# Patient Record
Sex: FEMALE | Race: BLACK OR AFRICAN AMERICAN | Employment: FULL TIME | URBAN - METROPOLITAN AREA
[De-identification: names, ages, dates, MRNs, and addresses within clinical notes are randomized per-mention and may not be internally consistent; named-entity substitution may affect disease eponyms.]

---

## 2017-02-21 ENCOUNTER — ALLIED HEALTH/NURSE VISIT (OUTPATIENT)
Dept: NURSING | Facility: CLINIC | Age: 42
End: 2017-02-21
Payer: COMMERCIAL

## 2017-02-21 DIAGNOSIS — Z11.1 SCREENING EXAMINATION FOR PULMONARY TUBERCULOSIS: Primary | ICD-10-CM

## 2017-02-21 PROCEDURE — 86580 TB INTRADERMAL TEST: CPT

## 2017-02-21 PROCEDURE — 99207 ZZC NO CHARGE NURSE ONLY: CPT

## 2017-02-21 NOTE — PROGRESS NOTES

## 2017-02-21 NOTE — MR AVS SNAPSHOT
"              After Visit Summary   2/21/2017    Minerva Thibodeaux    MRN: [id number]           Patient Information     Date Of Birth          1975        Visit Information        Provider Department      2/21/2017 3:20 PM BK ANCILL[facility]        Today's Diagnoses     Screening examination for pulmonary tuberculosis    -  1       Follow-ups after your visit        Follow-up notes from your care team     Return in about 2 days (around 2/23/2017) for injection.      Your next 10 appointments already scheduled     Feb 23, 2017  3:30 PM CST   Nurse Only with EVELIO RN   [facility] ([facility]    [address] [place] [id number] [phone]              Who to contact     If you have questions or need follow up information about today's clinic visit or your schedule please contact [facility] directly at [phone].  Normal or non-critical lab and imaging results will be communicated to you by MyChart, letter or phone within 4 business days after the clinic has received the results. If you do not hear from us within 7 days, please contact the clinic through MyChart or phone. If you have a critical or abnormal lab result, we will notify you by phone as soon as possible.  Submit refill requests through Joppel or call your pharmacy and they will forward the refill request to us. Please allow 3 business days for your refill to be completed.          Additional Information About Your Visit        MyChart Information     Joppel lets you send messages to your doctor, view your test results, renew your prescriptions, schedule appointments and more. To sign up, go to [email or web address] . Click on \"Log in\" on the left side of the screen, which will take you to the Welcome page. Then click on \"Sign up Now\" on the right side of the page.     You will be asked to enter the access code listed below, as well as some " personal information. Please follow the directions to create your username and password.     Your access code is: [email or web address]  Expires: 2017  3:44 PM     Your access code will  in 90 days. If you need help or a new code, please call your [facility] clinic or [phone].        Care EveryWhere ID     This is your Care EveryWhere ID. This could be used by other organizations to access your [facility] medical records  [id number]        Your Vitals Were     Last Period                   2010            Blood Pressure from Last 3 Encounters:   16 112/76   10/26/15 105/78   10/25/15 106/79    Weight from Last 3 Encounters:   16 165 lb (74.8 kg)   10/26/15 162 lb (73.5 kg)   10/25/15 162 lb 3.2 oz (73.6 kg)              We Performed the Following     TB INTRADERMAL TEST        Primary Care Provider Office Phone # Fax #    Viri Thomas Talamantes [phone]       [facility] [address] [facility] [address]        Thank you!     Thank you for choosing [facility]  for your care. Our goal is always to provide you with excellent care. Hearing back from our patients is one way we can continue to improve our services. Please take a few minutes to complete the written survey that you may receive in the mail after your visit with us. Thank you!             Your Updated Medication List - Protect others around you: Learn how to safely use, store and throw away your medicines at www.disposemymeds.org.          This list is accurate as of: 17  3:44 PM.  Always use your most recent med list.                   Brand Name Dispense Instructions for use    triamcinolone 0.1 % cream    KENALOG    60 g    Apply sparingly to affected area three times daily as needed

## 2017-02-23 ENCOUNTER — OFFICE VISIT (OUTPATIENT)
Dept: FAMILY MEDICINE | Facility: CLINIC | Age: 42
End: 2017-02-23
Payer: COMMERCIAL

## 2017-02-23 ENCOUNTER — RADIANT APPOINTMENT (OUTPATIENT)
Dept: GENERAL RADIOLOGY | Facility: CLINIC | Age: 42
End: 2017-02-23
Attending: PHYSICIAN ASSISTANT
Payer: COMMERCIAL

## 2017-02-23 ENCOUNTER — ALLIED HEALTH/NURSE VISIT (OUTPATIENT)
Dept: NURSING | Facility: CLINIC | Age: 42
End: 2017-02-23
Payer: COMMERCIAL

## 2017-02-23 DIAGNOSIS — R76.11 MANTOUX: POSITIVE: Primary | ICD-10-CM

## 2017-02-23 DIAGNOSIS — Z09 NEED FOR IMMUNIZATION FOLLOW-UP: ICD-10-CM

## 2017-02-23 DIAGNOSIS — R76.11 MANTOUX: POSITIVE: ICD-10-CM

## 2017-02-23 LAB
PPDINDURATION: 12 MM (ref 0–5)
PPDREDNESS: 12 MM

## 2017-02-23 PROCEDURE — 86735 MUMPS ANTIBODY: CPT | Performed by: PHYSICIAN ASSISTANT

## 2017-02-23 PROCEDURE — 99207 ZZC NO CHARGE NURSE ONLY: CPT

## 2017-02-23 PROCEDURE — 86762 RUBELLA ANTIBODY: CPT | Performed by: PHYSICIAN ASSISTANT

## 2017-02-23 PROCEDURE — 99213 OFFICE O/P EST LOW 20 MIN: CPT | Performed by: PHYSICIAN ASSISTANT

## 2017-02-23 PROCEDURE — 36415 COLL VENOUS BLD VENIPUNCTURE: CPT | Performed by: PHYSICIAN ASSISTANT

## 2017-02-23 PROCEDURE — 86765 RUBEOLA ANTIBODY: CPT | Performed by: PHYSICIAN ASSISTANT

## 2017-02-23 PROCEDURE — 71020 XR CHEST 2 VW: CPT

## 2017-02-23 PROCEDURE — 86787 VARICELLA-ZOSTER ANTIBODY: CPT | Performed by: PHYSICIAN ASSISTANT

## 2017-02-23 PROCEDURE — 86706 HEP B SURFACE ANTIBODY: CPT | Performed by: PHYSICIAN ASSISTANT

## 2017-02-23 NOTE — PROGRESS NOTES
SUBJECTIVE:                                                    Minerva Thibodeaux is a 41 year old female who presents to clinic today for the following health issues:      Concern - Positive mantoux reading          Description:   Had a positive mantoux reading today   Has had mantouxs done in the past but have not  been negative         Accompanying Signs & Symptoms:  No cough       Needs an xray done      Therapies Tried and outcome: N/A    Patient is asymptomatic.  Patient also needs proof of immunity for MMR, Varicella and Hep B.       Problem list and histories reviewed & adjusted, as indicated.  Additional history: as documented    Patient Active Problem List   Diagnosis     Recurrent boils     CARDIOVASCULAR SCREENING; LDL GOAL LESS THAN 160     Tobacco use disorder     H/O: hysterectomy     Drug-seeking behavior     Past Surgical History   Procedure Laterality Date     Laparotomy mini, tubal ligation, combined  2005     Laparoscopy  2010     HALS with partial liver resection for benign adenoma     Liver surgery  1/4/11     [facility], excise lesion/adenoma     Hysterectomy, pap no longer indicated  2011     TVH for adenomyosis, fibroids, Dr. Bridget TOWNSEND vaginal hysterectomy  2011     Cryotherapy, cervical  1999     Knee surgery         Social History   Substance Use Topics     Smoking status: Current Some Day Smoker     Packs/day: 0.20     Years: 15.00     Types: Cigarettes     Smokeless tobacco: Never Used      Comment: social 1-2/month     Alcohol use Yes      Comment: occasional  rare     Family History   Problem Relation Age of Onset     DIABETES Mother      Lipids Mother      GASTROINTESTINAL DISEASE Daughter      Hepatitis A     Hypertension Father      Unknown/Adopted Maternal Grandmother      CANCER Maternal Grandfather      CEREBROVASCULAR DISEASE Paternal Grandmother      Unknown/Adopted Paternal Grandfather          Current Outpatient Prescriptions   Medication Sig Dispense Refill     triamcinolone  (KENALOG) 0.1 % cream Apply sparingly to affected area three times daily as needed 60 g 0     Problem list, Medication list, Allergies, and Medical/Social/Surgical histories reviewed in EPIC and updated as appropriate.    ROS:  Constitutional, HEENT, cardiovascular, pulmonary, gi and gu systems are negative, except as otherwise noted.    OBJECTIVE:                                                    LMP 12/11/2010  Breastfeeding? No  There is no height or weight on file to calculate BMI.  GENERAL: healthy, alert and no distress  EYES: Eyes grossly normal to inspection,  conjunctivae and sclerae normal  HENT: normal cephalic/atraumatic, face is symmetrical,   RESP: no difficulty breathing, no use of accessory muscles observed.   CV: no peripheral edema and color normal, no parasternal heaves visualized.   MS: no gross musculoskeletal defects noted, no edema  SKIN: no suspicious lesions or rashes  NEURO: Normal strength and tone, mentation intact and speech normal  PSYCH: mentation appears normal, affect normal/bright      Diagnostic Test Results:  CXR - negative     ASSESSMENT/PLAN:                                                        ICD-10-CM    1. Mantoux: positive R76.11 XR Chest 2 Views   2. Need for immunization follow-up Z09 Rubeola Antibody IgG     Rubella Antibody IgG Quantitative     Mumps Antibody IgG     Varicella Zoster Virus Antibody IgG     Hepatitis B Surface Antibody     Titers are pending  Radiology report is pending  Patient will pick it up on Monday.       Cherie Pulido PA-C  [facility]

## 2017-02-23 NOTE — NURSING NOTE
"Chief Complaint   Patient presents with     Orders       Initial LMP 12/11/2010  Breastfeeding? No Estimated body mass index is 26.63 kg/(m^2) as calculated from the following:    Height as of 10/26/15: 5' 6\" (1.676 m).    Weight as of 9/11/16: 165 lb (74.8 kg).  Medication Reconciliation: complete     June Keith CMA      "

## 2017-02-23 NOTE — PROGRESS NOTES
Mantoux result: Positive.   Lab Results   Component Value Date    PPDREDNESS 12 02/23/2017    PPDINDURATIO 12 02/23/2017     Confirmed induration value by bringing in Dr. Rojas to assess mantoux area on patient's left forearm.  Patient needs to start work on Monday so requests immediate follow up. Patient placed on same day schedule. She is also requesting immunization record however, there is limited [facility] in [facility]. She reports her maiden name is Zoey ( 10 years ago). She also lived in [place], [place] 18 years ago. She reports she does not know what health system she was seen in. Unable to access immunization records. Will inform same day provider.     Daja Crockett RN

## 2017-02-23 NOTE — MR AVS SNAPSHOT
"              After Visit Summary   2017    Minerva Thibodeaux    MRN: [id number]           Patient Information     Date Of Birth          1975        Visit Information        Provider Department      2017 3:30 PM BK RN [facility]        Today's Diagnoses     Mantoux: positive    -  1       Follow-ups after your visit        Who to contact     If you have questions or need follow up information about today's clinic visit or your schedule please contact [facility] directly at [phone].  Normal or non-critical lab and imaging results will be communicated to you by AcuFocushart, letter or phone within 4 business days after the clinic has received the results. If you do not hear from us within 7 days, please contact the clinic through [email or web address] or phone. If you have a critical or abnormal lab result, we will notify you by phone as soon as possible.  Submit refill requests through eASIC or call your pharmacy and they will forward the refill request to us. Please allow 3 business days for your refill to be completed.          Additional Information About Your Visit        MyChart Information     eASIC lets you send messages to your doctor, view your test results, renew your prescriptions, schedule appointments and more. To sign up, go to [email or web address] . Click on \"Log in\" on the left side of the screen, which will take you to the Welcome page. Then click on \"Sign up Now\" on the right side of the page.     You will be asked to enter the access code listed below, as well as some personal information. Please follow the directions to create your username and password.     Your access code is: [email or web address]  Expires: 2017  3:44 PM     Your access code will  in 90 days. If you need help or a new code, please call your [facility] or [phone].        Care EveryWhere ID     This is your Care EveryWhere ID. This could be used by other organizations to " access your [facility] medical records  [id number]        Your Vitals Were     Last Period                   12/11/2010            Blood Pressure from Last 3 Encounters:   09/11/16 112/76   10/26/15 105/78   10/25/15 106/79    Weight from Last 3 Encounters:   09/11/16 165 lb (74.8 kg)   10/26/15 162 lb (73.5 kg)   10/25/15 162 lb 3.2 oz (73.6 kg)              Today, you had the following     No orders found for display       Primary Care Provider Office Phone # Fax #    Viri Thomas Talamantes [phone]       [facility] [address]  [facility] [address]        Thank you!     Thank you for choosing [facility]  for your care. Our goal is always to provide you with excellent care. Hearing back from our patients is one way we can continue to improve our services. Please take a few minutes to complete the written survey that you may receive in the mail after your visit with us. Thank you!             Your Updated Medication List - Protect others around you: Learn how to safely use, store and throw away your medicines at www.disposemymeds.org.          This list is accurate as of: 2/23/17  4:22 PM.  Always use your most recent med list.                   Brand Name Dispense Instructions for use    triamcinolone 0.1 % cream    KENALOG    60 g    Apply sparingly to affected area three times daily as needed

## 2017-02-23 NOTE — LETTER
[facility]   [address]  [place] [address]      February 24, 2017      Minerva Thibodeaux  [address] [facility] [place] [address]            Dear Minerva Thibodeaux        Enclosed is a copy of the results. Patient is immune to all titers that are tested. Xray is negative.     If you have any questions or concerns, please contact our team at [phone].      Sincerely,    Libby Pulido PAC/ak          Results for orders placed or performed in visit on 02/23/17   Rubeola Antibody IgG   Result Value Ref Range    Rubeola (Measles) Antibody IgG 5.3 (H) 0.0 - 0.8 AI   Rubella Antibody IgG Quantitative   Result Value Ref Range    Rubella Antibody IgG Quantitative 43 IU/mL   Mumps Antibody IgG   Result Value Ref Range    Mumps Antibody IgG 2.6 (H) 0.0 - 0.8 AI   Varicella Zoster Virus Antibody IgG   Result Value Ref Range    Varicella Zoster Virus Antibody IgG 7.4 (H) 0.0 - 0.8 AI   Hepatitis B Surface Antibody   Result Value Ref Range    Hepatitis B Surface Antibody 986.95 (H) <8.00 m[IU]/mL

## 2017-02-23 NOTE — MR AVS SNAPSHOT
"              After Visit Summary   2017    Minerva Thibodeaux    MRN: [id number]           Patient Information     Date Of Birth          1975        Visit Information        Provider Department      2017 4:20 PM Cherie Pulido PA-C [facility]        Today's Diagnoses     Mantoux: positive    -  1    Need for immunization follow-up           Follow-ups after your visit        Who to contact     If you have questions or need follow up information about today's clinic visit or your schedule please contact [facility] directly at [phone].  Normal or non-critical lab and imaging results will be communicated to you by DecisionPoint Systemshart, letter or phone within 4 business days after the clinic has received the results. If you do not hear from us within 7 days, please contact the clinic through DecisionPoint Systemshart or phone. If you have a critical or abnormal lab result, we will notify you by phone as soon as possible.  Submit refill requests through Bet[employer] Gaming or call your pharmacy and they will forward the refill request to us. Please allow 3 business days for your refill to be completed.          Additional Information About Your Visit        MyChart Information     Bet[employer] Gaming lets you send messages to your doctor, view your test results, renew your prescriptions, schedule appointments and more. To sign up, go to [email or web address] . Click on \"Log in\" on the left side of the screen, which will take you to the Welcome page. Then click on \"Sign up Now\" on the right side of the page.     You will be asked to enter the access code listed below, as well as some personal information. Please follow the directions to create your username and password.     Your access code is: [email or web address]  Expires: 2017  3:44 PM     Your access code will  in 90 days. If you need help or a new code, please call your [facility] or [phone].        Care EveryWhere ID     This is " your Care EveryWhere ID. This could be used by other organizations to access your [facility] medical records  [id number]        Your Vitals Were     Last Period Breastfeeding?                12/11/2010 No           Blood Pressure from Last 3 Encounters:   09/11/16 112/76   10/26/15 105/78   10/25/15 106/79    Weight from Last 3 Encounters:   09/11/16 165 lb (74.8 kg)   10/26/15 162 lb (73.5 kg)   10/25/15 162 lb 3.2 oz (73.6 kg)              We Performed the Following     Hepatitis B Surface Antibody     Mumps Antibody IgG     Rubella Antibody IgG Quantitative     Rubeola Antibody IgG     Varicella Zoster Virus Antibody IgG        Primary Care Provider Office Phone # Fax #    Viri Thomas Talamantes [phone]       [facility] [address] [place]  [facility] [address]        Thank you!     Thank you for choosing [facility]  for your care. Our goal is always to provide you with excellent care. Hearing back from our patients is one way we can continue to improve our services. Please take a few minutes to complete the written survey that you may receive in the mail after your visit with us. Thank you!             Your Updated Medication List - Protect others around you: Learn how to safely use, store and throw away your medicines at www.disposemymeds.org.          This list is accurate as of: 2/23/17  5:07 PM.  Always use your most recent med list.                   Brand Name Dispense Instructions for use    triamcinolone 0.1 % cream    KENALOG    60 g    Apply sparingly to affected area three times daily as needed

## 2017-02-24 LAB
HBV SURFACE AB SERPL IA-ACNC: 986.95 M[IU]/ML
MEV IGG SER QL IA: 5.3 AI (ref 0–0.8)
MUV IGG SER QL IA: 2.6 AI (ref 0–0.8)
RUBV IGG SERPL IA-ACNC: 43 IU/ML
VZV IGG SER QL IA: 7.4 AI (ref 0–0.8)

## 2017-05-12 ENCOUNTER — OFFICE VISIT (OUTPATIENT)
Dept: URGENT CARE | Facility: URGENT CARE | Age: 42
End: 2017-05-12
Payer: COMMERCIAL

## 2017-05-12 VITALS
HEART RATE: 83 BPM | TEMPERATURE: 97.7 F | OXYGEN SATURATION: 96 % | DIASTOLIC BLOOD PRESSURE: 76 MMHG | BODY MASS INDEX: 25.18 KG/M2 | SYSTOLIC BLOOD PRESSURE: 116 MMHG | WEIGHT: 156 LBS

## 2017-05-12 DIAGNOSIS — L30.9 ECZEMA, UNSPECIFIED TYPE: ICD-10-CM

## 2017-05-12 DIAGNOSIS — K52.9 GASTROENTERITIS: Primary | ICD-10-CM

## 2017-05-12 PROCEDURE — 99213 OFFICE O/P EST LOW 20 MIN: CPT | Performed by: FAMILY MEDICINE

## 2017-05-12 RX ORDER — OXYCODONE 18 MG/1
CAPSULE, EXTENDED RELEASE ORAL
Refills: 0 | COMMUNITY
Start: 2017-05-01 | End: 2018-02-07

## 2017-05-12 RX ORDER — TRIAMCINOLONE ACETONIDE 1 MG/G
CREAM TOPICAL
Qty: 60 G | Refills: 0 | Status: SHIPPED | OUTPATIENT
Start: 2017-05-12 | End: 2018-02-07

## 2017-05-12 RX ORDER — OXYCODONE HYDROCHLORIDE 10 MG/1
10 TABLET ORAL
COMMUNITY
Start: 2017-05-01 | End: 2017-05-29

## 2017-05-12 ASSESSMENT — PAIN SCALES - GENERAL: PAINLEVEL: EXTREME PAIN (8)

## 2017-05-12 NOTE — NURSING NOTE
"Chief Complaint   Patient presents with     Abdominal Pain     Pt c/o abdominal pain and cramping.        Initial /76 (BP Location: Left arm, Patient Position: Chair, Cuff Size: Adult Regular)  Pulse 83  Temp 97.7  F (36.5  C) (Oral)  Wt 156 lb (70.8 kg)  LMP 12/11/2010  SpO2 96%  Breastfeeding? No  BMI 25.18 kg/m2 Estimated body mass index is 25.18 kg/(m^2) as calculated from the following:    Height as of 10/26/15: 5' 6\" (1.676 m).    Weight as of this encounter: 156 lb (70.8 kg).  Medication Reconciliation: complete     Sussy Bennett CMA (AAMA)      "

## 2017-05-12 NOTE — LETTER
[facility]  [address] [facility] [address]  Phone: [phone]    May 12, 2017        Minerva Thibodeaux  [address] [facility] [address]          To whom it may concern:    RE: Minerva Thibodeaux      Patient was seen and treated today at our clinic and noted to have ongoing gastroenteritis. I would expect this to gradually resolve over the next 2-4 days without medications. She should remain well hydrated and focus on fluid replacement in the interim and gradually advance her diet as tolerated. Follow-up if symptoms persist or worsen.        Sincerely,        Andrew Lee MD

## 2017-05-12 NOTE — PROGRESS NOTES
Some of this note was populated by a medical assistant.      SUBJECTIVE:                                                    Minerva Thibodeaux is a 41 year old female who presents to clinic today for the following health issues:    Abdominal Pain      Duration: Monday    Description (location/character/radiation): cramping, fever, vomiting, nausea, headaches       Associated flank pain: None    Intensity:  severe    Accompanying signs and symptoms: feeling like she is constipated, sharp cramping about every 3-4 mins       Fever/Chills: YES       Gas/Bloating: no        Nausea/vomitting: YES, yesterday x 2        Diarrhea: YES- watery, x 2.            Dysuria or Hematuria: no     History (previous similar pain/trauma/previous testing): no     Precipitating or alleviating factors:       Pain worse with eating/BM/urination: loss of appetite/         Pain relieved by BM: no     Therapies tried and outcome: rest and fluids, aleve- no relief.    LMP:  Not applicable       Hx of hip impingement over the past year and taking pain medicines (see Rx list)      Problem list and histories reviewed & adjusted, as indicated.  Additional history: none    Patient Active Problem List   Diagnosis     Recurrent boils     CARDIOVASCULAR SCREENING; LDL GOAL LESS THAN 160     Tobacco use disorder     H/O: hysterectomy     Drug-seeking behavior     Past Surgical History:   Procedure Laterality Date     C VAGINAL HYSTERECTOMY  2011     CRYOTHERAPY, CERVICAL  1999     HYSTERECTOMY, PAP NO LONGER INDICATED  2011    TVH for adenomyosis, fibroids, Dr. Gill     KNEE SURGERY       LAPAROSCOPY  2010    HALS with partial liver resection for benign adenoma     LAPAROTOMY MINI, TUBAL LIGATION, COMBINED  2005     LIVER SURGERY  1/4/11    [facility], excise lesion/adenoma       Social History   Substance Use Topics     Smoking status: Current Some Day Smoker     Packs/day: 0.20     Years: 15.00     Types: Cigarettes     Smokeless tobacco: Never Used       Comment: social 1-2/month     Alcohol use Yes      Comment: occasional  rare     Family History   Problem Relation Age of Onset     DIABETES Mother      Lipids Mother      GASTROINTESTINAL DISEASE Daughter      Hepatitis A     Hypertension Father      Unknown/Adopted Maternal Grandmother      CANCER Maternal Grandfather      CEREBROVASCULAR DISEASE Paternal Grandmother      Unknown/Adopted Paternal Grandfather          Current Outpatient Prescriptions   Medication Sig Dispense Refill     oxyCODONE (ROXICODONE) 10 MG IR tablet Take 10 mg by mouth       XTAMPZA ER 18 MG C12A TK ONE C PO  BID  0     triamcinolone (KENALOG) 0.1 % cream Apply sparingly to affected area three times daily as needed 60 g 0     Allergies   Allergen Reactions     Acetaminophen      Instructed not to take until 1 year after liver surgery (i.e. 1/4/12)     Codeine GI Disturbance     Ibuprofen Hives       Reviewed and updated as needed this visit by clinical staff       Reviewed and updated as needed this visit by Provider         ROS:  Constitutional, HEENT, cardiovascular, pulmonary, gi and gu systems are negative, except as otherwise noted.    OBJECTIVE:                                                    /76 (BP Location: Left arm, Patient Position: Chair, Cuff Size: Adult Regular)  Pulse 83  Temp 97.7  F (36.5  C) (Oral)  Wt 156 lb (70.8 kg)  LMP 12/11/2010  SpO2 96%  Breastfeeding? No  BMI 25.18 kg/m2  Body mass index is 25.18 kg/(m^2).  GENERAL: healthy, alert and no distress  NECK: no adenopathy, no asymmetry, masses, or scars and thyroid normal to palpation  RESP: lungs clear to auscultation - no rales, rhonchi or wheezes  CV: regular rate and rhythm, normal S1 S2, no S3 or S4, no murmur, click or rub, no peripheral edema and peripheral pulses strong  ABDOMEN: soft, nontender, no hepatosplenomegaly, no masses and bowel sounds normal  MS: no gross musculoskeletal defects noted, no edema         ASSESSMENT/PLAN:                                                         ICD-10-CM    1. Gastroenteritis K52.9    2. Eczema, unspecified type L30.9 triamcinolone (KENALOG) 0.1 % cream     PLAN  Patient educational/instructional material provided including reasons for follow-up    Refill as above provided per request  The patient indicates understanding of these issues and agrees with the plan.  MD Andrew Villegas MD  [facility]

## 2017-05-12 NOTE — MR AVS SNAPSHOT
"              After Visit Summary   2017    Minerva Thibodeaux    MRN: [id number]           Patient Information     Date Of Birth          1975        Visit Information        Provider Department      2017 1:50 PM Andrwe Lee MD [facility]         Follow-ups after your visit        Who to contact     If you have questions or need follow up information about today's clinic visit or your schedule please contact [facility] directly at [phone].  Normal or non-critical lab and imaging results will be communicated to you by MyChart, letter or phone within 4 business days after the clinic has received the results. If you do not hear from us within 7 days, please contact the clinic through [facility] or phone. If you have a critical or abnormal lab result, we will notify you by phone as soon as possible.  Submit refill requests through [facility] or call your pharmacy and they will forward the refill request to us. Please allow 3 business days for your refill to be completed.          Additional Information About Your Visit        MyChart Information     [facility] lets you send messages to your doctor, view your test results, renew your prescriptions, schedule appointments and more. To sign up, go to [email or web address] . Click on \"Log in\" on the left side of the screen, which will take you to the Welcome page. Then click on \"Sign up Now\" on the right side of the page.     You will be asked to enter the access code listed below, as well as some personal information. Please follow the directions to create your username and password.     Your access code is: [email or web address]  Expires: 2017  4:44 PM     Your access code will  in 90 days. If you need help or a new code, please call your [facility] or [phone].        Care EveryWhere ID     This is your Care EveryWhere ID. This could be used by other organizations to access your [place] medical " records  [id number]        Your Vitals Were     Pulse Temperature Last Period Pulse Oximetry Breastfeeding? BMI (Body Mass Index)    83 97.7  F (36.5  C) (Oral) 12/11/2010 96% No 25.18 kg/m2       Blood Pressure from Last 3 Encounters:   05/12/17 116/76   09/11/16 112/76   10/26/15 105/78    Weight from Last 3 Encounters:   05/12/17 156 lb (70.8 kg)   09/11/16 165 lb (74.8 kg)   10/26/15 162 lb (73.5 kg)              Today, you had the following     No orders found for display       Primary Care Provider Office Phone # Fax #    Viri Talamantes [phone]       [address] [address]        Thank you!     Thank you for choosing [facility]  for your care. Our goal is always to provide you with excellent care. Hearing back from our patients is one way we can continue to improve our services. Please take a few minutes to complete the written survey that you may receive in the mail after your visit with us. Thank you!             Your Updated Medication List - Protect others around you: Learn how to safely use, store and throw away your medicines at www.disposemymeds.org.          This list is accurate as of: 5/12/17  2:30 PM.  Always use your most recent med list.                   Brand Name Dispense Instructions for use    oxyCODONE 10 MG IR tablet    ROXICODONE     Take 10 mg by mouth       triamcinolone 0.1 % cream    KENALOG    60 g    Apply sparingly to affected area three times daily as needed       XTAMPZA ER 18 MG C12a   Generic drug:  OxyCODONE ER      TK ONE C PO  BID

## 2017-10-01 ENCOUNTER — HEALTH MAINTENANCE LETTER (OUTPATIENT)
Age: 42
End: 2017-10-01

## 2018-02-07 ENCOUNTER — OFFICE VISIT (OUTPATIENT)
Dept: FAMILY MEDICINE | Facility: CLINIC | Age: 43
End: 2018-02-07
Payer: COMMERCIAL

## 2018-02-07 VITALS
RESPIRATION RATE: 14 BRPM | DIASTOLIC BLOOD PRESSURE: 80 MMHG | TEMPERATURE: 98.3 F | HEART RATE: 80 BPM | SYSTOLIC BLOOD PRESSURE: 118 MMHG | BODY MASS INDEX: 26.63 KG/M2 | OXYGEN SATURATION: 98 % | WEIGHT: 165 LBS

## 2018-02-07 DIAGNOSIS — Z13.0 SCREENING FOR DEFICIENCY ANEMIA: ICD-10-CM

## 2018-02-07 DIAGNOSIS — L30.9 ECZEMA, UNSPECIFIED TYPE: ICD-10-CM

## 2018-02-07 DIAGNOSIS — M25.851 HIP IMPINGEMENT SYNDROME, RIGHT: ICD-10-CM

## 2018-02-07 DIAGNOSIS — R21 RASH: Primary | ICD-10-CM

## 2018-02-07 DIAGNOSIS — Z13.220 SCREENING FOR CHOLESTEROL LEVEL: ICD-10-CM

## 2018-02-07 DIAGNOSIS — Z13.1 ENCOUNTER FOR SCREENING EXAMINATION FOR IMPAIRED GLUCOSE REGULATION AND DIABETES MELLITUS: ICD-10-CM

## 2018-02-07 LAB
ALBUMIN SERPL-MCNC: 3.6 G/DL (ref 3.4–5)
ALP SERPL-CCNC: 51 U/L (ref 40–150)
ALT SERPL W P-5'-P-CCNC: 15 U/L (ref 0–50)
ANION GAP SERPL CALCULATED.3IONS-SCNC: 8 MMOL/L (ref 3–14)
AST SERPL W P-5'-P-CCNC: 12 U/L (ref 0–45)
BASOPHILS # BLD AUTO: 0 10E9/L (ref 0–0.2)
BASOPHILS NFR BLD AUTO: 0.4 %
BILIRUB SERPL-MCNC: 0.2 MG/DL (ref 0.2–1.3)
BUN SERPL-MCNC: 10 MG/DL (ref 7–30)
CALCIUM SERPL-MCNC: 8.9 MG/DL (ref 8.5–10.1)
CHLORIDE SERPL-SCNC: 103 MMOL/L (ref 94–109)
CHOLEST SERPL-MCNC: 143 MG/DL
CO2 SERPL-SCNC: 27 MMOL/L (ref 20–32)
CREAT SERPL-MCNC: 0.85 MG/DL (ref 0.52–1.04)
DIFFERENTIAL METHOD BLD: NORMAL
EOSINOPHIL # BLD AUTO: 0.2 10E9/L (ref 0–0.7)
EOSINOPHIL NFR BLD AUTO: 4.5 %
ERYTHROCYTE [DISTWIDTH] IN BLOOD BY AUTOMATED COUNT: 13.9 % (ref 10–15)
GFR SERPL CREATININE-BSD FRML MDRD: 73 ML/MIN/1.7M2
GLUCOSE SERPL-MCNC: 117 MG/DL (ref 70–99)
HBA1C MFR BLD: 5.5 % (ref 4.3–6)
HCT VFR BLD AUTO: 37.5 % (ref 35–47)
HDLC SERPL-MCNC: 69 MG/DL
HGB BLD-MCNC: 12.6 G/DL (ref 11.7–15.7)
LDLC SERPL CALC-MCNC: 63 MG/DL
LYMPHOCYTES # BLD AUTO: 1.6 10E9/L (ref 0.8–5.3)
LYMPHOCYTES NFR BLD AUTO: 34.2 %
MCH RBC QN AUTO: 30.4 PG (ref 26.5–33)
MCHC RBC AUTO-ENTMCNC: 33.6 G/DL (ref 31.5–36.5)
MCV RBC AUTO: 90 FL (ref 78–100)
MONOCYTES # BLD AUTO: 0.4 10E9/L (ref 0–1.3)
MONOCYTES NFR BLD AUTO: 9.2 %
NEUTROPHILS # BLD AUTO: 2.4 10E9/L (ref 1.6–8.3)
NEUTROPHILS NFR BLD AUTO: 51.7 %
NONHDLC SERPL-MCNC: 74 MG/DL
PLATELET # BLD AUTO: 231 10E9/L (ref 150–450)
POTASSIUM SERPL-SCNC: 3.9 MMOL/L (ref 3.4–5.3)
PROT SERPL-MCNC: 7.4 G/DL (ref 6.8–8.8)
RBC # BLD AUTO: 4.15 10E12/L (ref 3.8–5.2)
SODIUM SERPL-SCNC: 138 MMOL/L (ref 133–144)
TRIGL SERPL-MCNC: 53 MG/DL
WBC # BLD AUTO: 4.7 10E9/L (ref 4–11)

## 2018-02-07 PROCEDURE — 83036 HEMOGLOBIN GLYCOSYLATED A1C: CPT | Performed by: PHYSICIAN ASSISTANT

## 2018-02-07 PROCEDURE — 80053 COMPREHEN METABOLIC PANEL: CPT | Performed by: PHYSICIAN ASSISTANT

## 2018-02-07 PROCEDURE — 85025 COMPLETE CBC W/AUTO DIFF WBC: CPT | Performed by: PHYSICIAN ASSISTANT

## 2018-02-07 PROCEDURE — 99214 OFFICE O/P EST MOD 30 MIN: CPT | Performed by: PHYSICIAN ASSISTANT

## 2018-02-07 PROCEDURE — 80061 LIPID PANEL: CPT | Performed by: PHYSICIAN ASSISTANT

## 2018-02-07 PROCEDURE — 36415 COLL VENOUS BLD VENIPUNCTURE: CPT | Performed by: PHYSICIAN ASSISTANT

## 2018-02-07 RX ORDER — TRIAMCINOLONE ACETONIDE 1 MG/G
CREAM TOPICAL
Qty: 60 G | Refills: 0 | Status: SHIPPED | OUTPATIENT
Start: 2018-02-07 | End: 2019-06-13

## 2018-02-07 RX ORDER — PREDNISONE 10 MG/1
TABLET ORAL
Qty: 12 TABLET | Refills: 0 | Status: SHIPPED | OUTPATIENT
Start: 2018-02-07 | End: 2019-06-13

## 2018-02-07 RX ORDER — OXYCODONE HYDROCHLORIDE 10 MG/1
15 TABLET ORAL 3 TIMES DAILY
Refills: 0 | COMMUNITY
Start: 2018-02-02 | End: 2023-02-11

## 2018-02-07 RX ORDER — HYDROXYZINE PAMOATE 25 MG/1
CAPSULE ORAL
Qty: 30 CAPSULE | Refills: 1 | Status: SHIPPED | OUTPATIENT
Start: 2018-02-07 | End: 2019-06-13

## 2018-02-07 RX ORDER — AMITRIPTYLINE HYDROCHLORIDE 50 MG/1
TABLET ORAL
Refills: 0 | COMMUNITY
Start: 2018-01-05 | End: 2023-02-11

## 2018-02-07 ASSESSMENT — PAIN SCALES - GENERAL: PAINLEVEL: NO PAIN (0)
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
## 2018-02-07 NOTE — PATIENT INSTRUCTIONS
Dermatitis (Non-Specific)  Dermatitis is an inflammation of the skin. The exact cause of your rash is not certain. However, this rash does not appear to be an infection or contagious illness. Taking care of the rash at home should help relieve your symptoms.  Home Care:    Keep the areas of rash clean by washing it daily. This also helps to keep the skin moist.    Use a neutral pH soap such as Dove or Lever 2000.    Apply a moisturizing lotion after bathing to prevent dry skin.     Avoid skin irritants (wool or silk clothing, grease, oils, some medicines, harsh soaps, and detergents). Wear absorbent, soft fabrics next to the skin rather than rough or scratchy materials.    Unless another medicine was prescribed, you may use Hydrocortisone cream (which you can get without a prescription) to reduce the inflammation.  Follow Up:  Make an appointment with your doctor in the next 1 to 2 weeks if your symptoms do not improve with the above measures.  Get Prompt Medical Attention  if any of the following occur:    Increasing area of redness or pain in the skin    Yellow crusts or drainage from the rash    Joint pain    New rash that appears in other areas of the body    Fever of 100.4 F (38 C) or higher, or as directed by your healthcare provider    5982-1923 [facility], [address] [place] [address], [place] [address]. All rights reserved. This information is not intended as a substitute for professional medical care. Always follow your healthcare professional's instructions.

## 2018-02-07 NOTE — LETTER
February 7, 2018      Minerva Thibodeaux  [address] [facility] [address]        Dear ,    We are writing to inform you of your test results.    All of your labs were normal.     Please contact the clinic if you have additional questions or if symptoms persist.  Thank you.     Resulted Orders   Lipid Profile   Result Value Ref Range    Cholesterol 143 <200 mg/dL    Triglycerides 53 <150 mg/dL      Comment:      Non Fasting    HDL Cholesterol 69 >49 mg/dL    LDL Cholesterol Calculated 63 <100 mg/dL      Comment:      Desirable:       <100 mg/dl    Non HDL Cholesterol 74 <130 mg/dL   Hemoglobin A1c   Result Value Ref Range    Hemoglobin A1C 5.5 4.3 - 6.0 %   CBC with platelets differential   Result Value Ref Range    WBC 4.7 4.0 - 11.0 10e9/L    RBC Count 4.15 3.8 - 5.2 10e12/L    Hemoglobin 12.6 11.7 - 15.7 g/dL    Hematocrit 37.5 35.0 - 47.0 %    MCV 90 78 - 100 fl    MCH 30.4 26.5 - 33.0 pg    MCHC 33.6 31.5 - 36.5 g/dL    RDW 13.9 10.0 - 15.0 %    Platelet Count 231 150 - 450 10e9/L    Diff Method Automated Method     % Neutrophils 51.7 %    % Lymphocytes 34.2 %    % Monocytes 9.2 %    % Eosinophils 4.5 %    % Basophils 0.4 %    Absolute Neutrophil 2.4 1.6 - 8.3 10e9/L    Absolute Lymphocytes 1.6 0.8 - 5.3 10e9/L    Absolute Monocytes 0.4 0.0 - 1.3 10e9/L    Absolute Eosinophils 0.2 0.0 - 0.7 10e9/L    Absolute Basophils 0.0 0.0 - 0.2 10e9/L   Comprehensive metabolic panel   Result Value Ref Range    Sodium 138 133 - 144 mmol/L    Potassium 3.9 3.4 - 5.3 mmol/L    Chloride 103 94 - 109 mmol/L    Carbon Dioxide 27 20 - 32 mmol/L    Anion Gap 8 3 - 14 mmol/L    Glucose 117 (H) 70 - 99 mg/dL      Comment:      Non Fasting    Urea Nitrogen 10 7 - 30 mg/dL    Creatinine 0.85 0.52 - 1.04 mg/dL    GFR Estimate 73 >60 mL/min/1.7m2      Comment:      Non  GFR Calc    GFR Estimate If Black 88 >60 mL/min/1.7m2      Comment:       GFR Calc    Calcium 8.9 8.5 - 10.1 mg/dL     Bilirubin Total 0.2 0.2 - 1.3 mg/dL    Albumin 3.6 3.4 - 5.0 g/dL    Protein Total 7.4 6.8 - 8.8 g/dL    Alkaline Phosphatase 51 40 - 150 U/L    ALT 15 0 - 50 U/L    AST 12 0 - 45 U/L       If you have any questions or concerns, please call the clinic at the number listed above.       Sincerely,        TITUS Dowd

## 2018-02-07 NOTE — MR AVS SNAPSHOT
After Visit Summary   2/7/2018    Minerva Thibodeaux    MRN: [id number]           Patient Information     Date Of Birth          1975        Visit Information        Provider Department      2/7/2018 9:20 AM Chago Zheng PA [facility]        Today's Diagnoses     Rash    -  1    Hip impingement syndrome, right        Screening for cholesterol level        Encounter for screening examination for impaired glucose regulation and diabetes mellitus        Screening for deficiency anemia          Care Instructions        Dermatitis (Non-Specific)  Dermatitis is an inflammation of the skin. The exact cause of your rash is not certain. However, this rash does not appear to be an infection or contagious illness. Taking care of the rash at home should help relieve your symptoms.  Home Care:    Keep the areas of rash clean by washing it daily. This also helps to keep the skin moist.    Use a neutral pH soap such as Dove or Lever 2000.    Apply a moisturizing lotion after bathing to prevent dry skin.     Avoid skin irritants (wool or silk clothing, grease, oils, some medicines, harsh soaps, and detergents). Wear absorbent, soft fabrics next to the skin rather than rough or scratchy materials.    Unless another medicine was prescribed, you may use Hydrocortisone cream (which you can get without a prescription) to reduce the inflammation.  Follow Up:  Make an appointment with your doctor in the next 1 to 2 weeks if your symptoms do not improve with the above measures.  Get Prompt Medical Attention  if any of the following occur:    Increasing area of redness or pain in the skin    Yellow crusts or drainage from the rash    Joint pain    New rash that appears in other areas of the body    Fever of 100.4 F (38 C) or higher, or as directed by your healthcare provider    7421-8922 [employer], [address] [place] [address], [place] [address]. All rights reserved. This information is not  intended as a substitute for professional medical care. Always follow your healthcare professional's instructions.                  Follow-ups after your visit        Additional Services     ORTHO  REFERRAL       [facility] is referring you to the Orthopedic  Services at [facility].       The  Representative will assist you in the coordination of your Orthopedic and Musculoskeletal Care as prescribed by your physician.    The  Representative will call you within 1 business day to help schedule your appointment, or you may contact the  Representative at:    All areas ~ [phone]     Type of Referral : Surgical / Specialist       Timeframe requested: Routine    Coverage of these services is subject to the terms and limitations of your health insurance plan.  Please call member services at your health plan with any benefit or coverage questions.      If X-rays, CT or MRI's have been performed, please contact the facility where they were done to arrange for , prior to your scheduled appointment.  Please bring this referral request to your appointment and present it to your specialist.                  Follow-up notes from your care team     Return in about 4 weeks (around 3/7/2018), or if symptoms worsen or fail to improve, for PAP smear.      Who to contact     If you have questions or need follow up information about today's clinic visit or your schedule please contact [facility] directly at [phone].  Normal or non-critical lab and imaging results will be communicated to you by MyChart, letter or phone within 4 business days after the clinic has received the results. If you do not hear from us within 7 days, please contact the clinic through MyChart or phone. If you have a critical or abnormal lab result, we will notify you by phone as soon as possible.  Submit refill requests through [facility] [facility] or  "call your pharmacy and they will forward the refill request to us. Please allow 3 business days for your refill to be completed.          Additional Information About Your Visit        MyChart Information     [facility] lets you send messages to your doctor, view your test results, renew your prescriptions, schedule appointments and more. To sign up, go to [email or web address] . Click on \"Log in\" on the left side of the screen, which will take you to the Welcome page. Then click on \"Sign up Now\" on the right side of the page.     You will be asked to enter the access code listed below, as well as some personal information. Please follow the directions to create your username and password.     Your access code is: [email or web address]  Expires: 2018  9:53 AM     Your access code will  in 90 days. If you need help or a new code, please call your [place] clinic or [phone].        Care EveryWhere ID     This is your Care EveryWhere ID. This could be used by other organizations to access your [place] medical records  [id number]        Your Vitals Were     Pulse Temperature Respirations Last Period Pulse Oximetry BMI (Body Mass Index)    80 98.3  F (36.8  C) (Oral) 14 2010 98% 26.63 kg/m2       Blood Pressure from Last 3 Encounters:   18 118/80   17 116/76   16 112/76    Weight from Last 3 Encounters:   18 165 lb (74.8 kg)   17 156 lb (70.8 kg)   16 165 lb (74.8 kg)              We Performed the Following     CBC with platelets differential     Comprehensive metabolic panel     Hemoglobin A1c     JUST IN CASE     Lipid Profile     ORTHO  REFERRAL          Today's Medication Changes          These changes are accurate as of 18  9:53 AM.  If you have any questions, ask your nurse or doctor.               Start taking these medicines.        Dose/Directions    hydrOXYzine 25 MG capsule   Commonly known as:  VISTARIL   Used for:  Rash   Started by:  Norm " TITUS Diallo        1-2 tabs every 6 hours prn itch or rash   Quantity:  30 capsule   Refills:  1       predniSONE 10 MG tablet   Commonly known as:  DELTASONE   Used for:  Rash   Started by:  Chago Zheng PA        3 tabs PO QD x 2 days then 2 tabs PO QD x 2 days then 1 tab PO QD x 2 days   Quantity:  12 tablet   Refills:  0            Where to get your medicines      [place] [facility] [place] [address]  [address], [facility] [address]     Phone:  [phone]     hydrOXYzine 25 MG capsule    predniSONE 10 MG tablet                Primary Care Provider Office Phone # Fax #    Viri Thomas Talamantes [phone] [address] [facility] [address]        Equal Access to Services     [facility]: Hadii aad ku hadasho Soomaali, waaxda luqadaha, qaybta kaalmada adeegyada, waxay idiin hayaan ansley kharajasmina larsen . So [facility] [phone].    ATENCIÓN: Si habla español, tiene a curiel disposición servicios gratuitos de asistencia lingüística. [facility] [phone].    We comply with applicable federal civil rights laws and [place] laws. We do not discriminate on the basis of race, color, national origin, age, disability, sex, sexual orientation, or gender identity.            Thank you!     Thank you for choosing [facility]  for your care. Our goal is always to provide you with excellent care. Hearing back from our patients is one way we can continue to improve our services. Please take a few minutes to complete the written survey that you may receive in the mail after your visit with us. Thank you!             Your Updated Medication List - Protect others around you: Learn how to safely use, store and throw away your medicines at www.disposemymeds.org.          This list is accurate as of 2/7/18  9:53 AM.  Always use your most recent med list.                   Brand Name  Dispense Instructions for use Diagnosis    amitriptyline 50 MG tablet    ELAVIL          hydrOXYzine 25 MG capsule    VISTARIL    30 capsule    1-2 tabs every 6 hours prn itch or rash    Rash       oxyCODONE IR 10 MG tablet    ROXICODONE     TK 1 T PO TID PRN FOR PAIN        predniSONE 10 MG tablet    DELTASONE    12 tablet    3 tabs PO QD x 2 days then 2 tabs PO QD x 2 days then 1 tab PO QD x 2 days    Rash       triamcinolone 0.1 % cream    KENALOG    60 g    Apply sparingly to affected area three times daily as needed    Eczema, unspecified type

## 2018-02-07 NOTE — PROGRESS NOTES
Ms. Thibodeaux,    All of your labs were normal.    Please contact the clinic if you have additional questions or if symptoms persist.  Thank you.    Sincerely,    Chago Zheng

## 2019-06-13 ENCOUNTER — OFFICE VISIT (OUTPATIENT)
Dept: URGENT CARE | Facility: URGENT CARE | Age: 44
End: 2019-06-13
Payer: COMMERCIAL

## 2019-06-13 VITALS
HEART RATE: 85 BPM | SYSTOLIC BLOOD PRESSURE: 160 MMHG | BODY MASS INDEX: 29.31 KG/M2 | WEIGHT: 181.6 LBS | TEMPERATURE: 98.4 F | OXYGEN SATURATION: 97 % | DIASTOLIC BLOOD PRESSURE: 88 MMHG

## 2019-06-13 DIAGNOSIS — J03.90 TONSILLITIS: Primary | ICD-10-CM

## 2019-06-13 DIAGNOSIS — R07.0 THROAT PAIN: ICD-10-CM

## 2019-06-13 LAB
DEPRECATED S PYO AG THROAT QL EIA: NORMAL
SPECIMEN SOURCE: NORMAL

## 2019-06-13 PROCEDURE — 87081 CULTURE SCREEN ONLY: CPT | Performed by: PHYSICIAN ASSISTANT

## 2019-06-13 PROCEDURE — 87880 STREP A ASSAY W/OPTIC: CPT | Performed by: PHYSICIAN ASSISTANT

## 2019-06-13 PROCEDURE — 99213 OFFICE O/P EST LOW 20 MIN: CPT | Performed by: PHYSICIAN ASSISTANT

## 2019-06-13 RX ORDER — LIDOCAINE HYDROCHLORIDE 20 MG/ML
15 SOLUTION OROPHARYNGEAL
Qty: 100 ML | Refills: 0 | Status: SHIPPED | OUTPATIENT
Start: 2019-06-13 | End: 2023-02-11

## 2019-06-13 RX ORDER — GABAPENTIN 800 MG/1
600 TABLET ORAL 4 TIMES DAILY
Refills: 0 | COMMUNITY
Start: 2019-04-03 | End: 2023-02-11

## 2019-06-13 RX ORDER — PENICILLIN V POTASSIUM 500 MG/1
500 TABLET, FILM COATED ORAL 2 TIMES DAILY
Qty: 20 TABLET | Refills: 0 | Status: SHIPPED | OUTPATIENT
Start: 2019-06-13 | End: 2019-08-06

## 2019-06-13 ASSESSMENT — ENCOUNTER SYMPTOMS
SORE THROAT: 1
SINUS PAIN: 0
CHILLS: 0
FEVER: 1
PALPITATIONS: 0
RESPIRATORY NEGATIVE: 1
RHINORRHEA: 0
WHEEZING: 0
CARDIOVASCULAR NEGATIVE: 1
HEADACHES: 1
GASTROINTESTINAL NEGATIVE: 1
SINUS PRESSURE: 0
FATIGUE: 0
COUGH: 0
SHORTNESS OF BREATH: 0

## 2019-06-13 NOTE — LETTER
6/14/2019       Minerva Thibodeaux  [address] [place] [address]      Dear Minerva:    Thank you for allowing me to participate in your care. Your recent test results were reviewed and listed below.      Your results are normal- negative throat culture for strep.        Thank you for choosing [facility]. As a result, please continue with the treatment plan discussed in the office. Return as discussed or sooner if symptoms worsens or fail to improve. If you have any further questions or concerns, please do not hesitate to contact us.      Sincerely,    Lee Ann Meyers PA-C   [facility]  [address] [facility] [address]  Phone: [phone]

## 2019-06-13 NOTE — LETTER
[facility]  [address] [facility] [address]    2019    Re: Minerva Thibodeaux  [address] [facility] [address]  [phone] (home)     : 1975      To Whom It May Concern:      Minerva Thibodeaux was seen in clinic today and is unable to work today due to her symptoms.  Please feel free to contact me via phone if you have any questions or concerns.        Sincerely,      Minerva García PA-C

## 2019-06-13 NOTE — PROGRESS NOTES
Subjective   Minerva Thibodeaux is a 43 year old female who presents to clinic today for the following health issues:  HPI   RESPIRATORY SYMPTOMS    Duration: 2days    Description  sore throat, fever and headache    Severity: moderate    Accompanying signs and symptoms: No cough, shortness of breath or wheezing.  No sinus congestion/pain/pressure.  No abdominal pain, n/v, constipation, diarrhea, bloody or black tarry stools.       History (predisposing factors):  Ill contacts at work.  No pmh of asthma.  Non-smoker.    Precipitating or alleviating factors: None    Therapies tried and outcome:  rest and fluids with minimal relief    Patient Active Problem List   Diagnosis     Recurrent boils     CARDIOVASCULAR SCREENING; LDL GOAL LESS THAN 160     Tobacco use disorder     H/O: hysterectomy     Drug-seeking behavior     Hip impingement syndrome, right     Past Surgical History:   Procedure Laterality Date     C VAGINAL HYSTERECTOMY  2011     CRYOTHERAPY, CERVICAL  1999     HYSTERECTOMY, PAP NO LONGER INDICATED  2011    [facility] for adenomyosis, fibroids, Dr. Gill     KNEE SURGERY       LAPAROSCOPY  2010    [facility] with partial liver resection for benign adenoma     LAPAROTOMY MINI, TUBAL LIGATION, COMBINED  2005     LIVER SURGERY  1/4/11    [facility], excise lesion/adenoma       Social History     Tobacco Use     Smoking status: Current Some Day Smoker     Packs/day: 0.20     Years: 15.00     Pack years: 3.00     Types: Cigarettes     Smokeless tobacco: Never Used     Tobacco comment: social 1-2/month   Substance Use Topics     Alcohol use: Yes     Comment: occasional  rare     Family History   Problem Relation Age of Onset     Diabetes Mother      Lipids Mother      Gastrointestinal Disease Daughter         Hepatitis A     Hypertension Father      Unknown/Adopted Maternal Grandmother      Cancer Maternal Grandfather      Cerebrovascular Disease Paternal Grandmother      Unknown/Adopted Paternal Grandfather          Current  Outpatient Medications   Medication Sig Dispense Refill     gabapentin (NEURONTIN) 800 MG tablet TAKE 1 BY MOUTH 4 TIMES A DAY FOR NEUROPATHIC PAIN  0     oxyCODONE IR (ROXICODONE) 10 MG tablet TK 1 T PO TID PRN FOR PAIN  0     amitriptyline (ELAVIL) 50 MG tablet   0     Allergies   Allergen Reactions     Acetaminophen      Instructed not to take until 1 year after liver surgery (i.e. 1/4/12)     Codeine GI Disturbance     Ibuprofen Hives     Reviewed and updated as needed this visit by Provider       Review of Systems   Constitutional: Positive for fever. Negative for chills and fatigue.   HENT: Positive for sore throat. Negative for congestion, ear discharge, ear pain, hearing loss, rhinorrhea, sinus pressure and sinus pain.    Respiratory: Negative.  Negative for cough, shortness of breath and wheezing.    Cardiovascular: Negative.  Negative for chest pain, palpitations and peripheral edema.   Gastrointestinal: Negative.    Neurological: Positive for headaches.   All other systems reviewed and are negative.           Objective    /88   Pulse 85   Temp 98.4  F (36.9  C) (Oral)   Wt 82.4 kg (181 lb 9.6 oz)   LMP 12/11/2010   SpO2 97%   BMI 29.31 kg/m    Body mass index is 29.31 kg/m .  Physical Exam   Constitutional: She is oriented to person, place, and time. She appears well-developed and well-nourished. No distress.   HENT:   Head: Normocephalic and atraumatic.   Nose: Nose normal.   Mouth/Throat: Uvula is midline and mucous membranes are normal. Posterior oropharyngeal erythema present. No oropharyngeal exudate, posterior oropharyngeal edema or tonsillar abscesses.   TMs are intact without any erythema or bulging bilaterally.  Airway is patent.   Eyes: Pupils are equal, round, and reactive to light. Conjunctivae and EOM are normal. No scleral icterus.   Neck: Normal range of motion. Neck supple.   Cardiovascular: Normal rate, regular rhythm, normal heart sounds and intact distal pulses. Exam reveals  no gallop.   No murmur heard.  Pulmonary/Chest: Effort normal and breath sounds normal. No accessory muscle usage. No respiratory distress. She has no decreased breath sounds. She has no wheezes. She has no rhonchi. She has no rales. She exhibits no tenderness.   Lymphadenopathy:        Head (right side): Tonsillar adenopathy present.        Head (left side): Tonsillar adenopathy present.     She has no cervical adenopathy.   Neurological: She is alert and oriented to person, place, and time.   Skin: Skin is warm and dry.   Psychiatric: She has a normal mood and affect. Her behavior is normal. Judgment and thought content normal.   Nursing note and vitals reviewed.  Diagnostic Test Results:  Labs reviewed in Epic  Results for orders placed or performed in visit on 06/13/19 (from the past 24 hour(s))   Strep, Rapid Screen   Result Value Ref Range    Specimen Description Throat     Rapid Strep A Screen       NEGATIVE: No Group A streptococcal antigen detected by immunoassay, await culture report.           Assessment & Plan   Tonsillitis:  RST is negative, will send for throat culture.  Will treat for tonsillitis with [id number] based on H&P and give lidocaine oral viscous as needed for sore throat.  Recommend ibuprofen prn pain/fever, warm salt water gargles, lozenges or cough drops.  Recheck in clinic if symptoms worsen or if symptoms do not improve.    -     penicillin V (VEETID) 500 MG tablet; Take 1 tablet (500 mg) by mouth 2 times daily for 10 days  -     lidocaine HCl (XYLOCAINE) 2 % solution; Swish and spit 15 mLs in mouth every 3 hours as needed for moderate pain ; Max 8 doses/24 hour period.    Throat pain  -     Strep, Rapid Screen  -     Beta strep group A culture        Minerva García PA-C  [facility]

## 2019-06-14 LAB
BACTERIA SPEC CULT: NORMAL
SPECIMEN SOURCE: NORMAL

## 2019-08-06 ENCOUNTER — OFFICE VISIT (OUTPATIENT)
Dept: URGENT CARE | Facility: URGENT CARE | Age: 44
End: 2019-08-06
Payer: COMMERCIAL

## 2019-08-06 VITALS
BODY MASS INDEX: 28.89 KG/M2 | TEMPERATURE: 98.6 F | OXYGEN SATURATION: 99 % | SYSTOLIC BLOOD PRESSURE: 126 MMHG | WEIGHT: 179 LBS | DIASTOLIC BLOOD PRESSURE: 79 MMHG | RESPIRATION RATE: 18 BRPM | HEART RATE: 70 BPM

## 2019-08-06 DIAGNOSIS — K59.00 CONSTIPATION, UNSPECIFIED CONSTIPATION TYPE: Primary | ICD-10-CM

## 2019-08-06 DIAGNOSIS — R10.9 ABDOMINAL CRAMPING: ICD-10-CM

## 2019-08-06 PROCEDURE — 99213 OFFICE O/P EST LOW 20 MIN: CPT | Performed by: NURSE PRACTITIONER

## 2019-08-06 RX ORDER — POLYETHYLENE GLYCOL 3350 17 G/17G
17 POWDER, FOR SOLUTION ORAL DAILY
Qty: 10 PACKET | Refills: 0 | Status: SHIPPED | OUTPATIENT
Start: 2019-08-06 | End: 2019-08-16

## 2019-08-06 RX ORDER — DOXYCYCLINE 100 MG/1
100 CAPSULE ORAL 2 TIMES DAILY
Qty: 20 CAPSULE | Refills: 0 | Status: CANCELLED | OUTPATIENT
Start: 2019-08-06 | End: 2019-08-16

## 2019-08-06 ASSESSMENT — ENCOUNTER SYMPTOMS
FATIGUE: 0
ABDOMINAL DISTENTION: 1
APPETITE CHANGE: 1
FEVER: 0
HEARTBURN: 0
CHILLS: 0
DIZZINESS: 0
ACTIVITY CHANGE: 0
VOMITING: 1
LIGHT-HEADEDNESS: 0
ABDOMINAL PAIN: 1
NAUSEA: 0
DIAPHORESIS: 0
SLEEP DISTURBANCE: 0
HEMATOCHEZIA: 0
CONSTIPATION: 1
ANAL BLEEDING: 0
DIARRHEA: 0

## 2019-08-06 ASSESSMENT — PAIN SCALES - GENERAL: PAINLEVEL: SEVERE PAIN (6)

## 2019-08-06 NOTE — LETTER
[facility]  [address] [facility] [address]  Phone: [phone]        2019    Minerva Thibodeaux  [address] [facility] [address]  [phone] (home)     :     1975      To Whom it May Concern:    This patient missed work 2019 and 2019 due to illness. Please excuse medical related absences and return to work as tolerated.    Please contact me for questions or concerns.    Sincerely,    Piedad Urrutia, CRISTIAN-BC

## 2019-08-07 NOTE — PATIENT INSTRUCTIONS
Discussed diagnosis of colitis per [facility] ED  Mirilax one capful per day to help have bowel movement  Warm fluids, tea, soup  Antibiotic not warranted at this time    ED if symptoms continue or worsen - severe abdominal pain, bleeding, dizziness, or no BM for more than 1 week  Patient Education     Understanding Colitis   Colitis is when a part of your colon becomes inflamed or swollen. The colon is also called the large intestine. It helps with digestion and waste removal.   What causes colitis?   Colitis can be caused by many things. The most common causes are:    Viral or bacterial infections    Inflammatory bowel disease (ulcerative colitis or Crohn s disease)    Certain medicines, such as antibiotics    Radiation therapy to the colon   Symptoms of colitis   The symptoms of colitis may last a short time. Or they can be chronic. The most common symptoms are:    Diarrhea, sometimes bloody    Stomach pain or cramping    Fever    Weight loss in severe cases   Diagnosing colitis  Your healthcare provider will take a full health history and family history. He or she will also give you a physical exam. Depending on the results of your history and physical exam, your provider may also order certain tests to help find out the cause of your colitis. These may include:    Lab tests. Your blood and stool will be checked.    Endoscopy and biopsy.  Endoscopy uses a long, flexible tube with a tiny light and camera on one end to check the inside of your large intestine. When only the colon is checked, this is called a colonoscopy. During an endoscopy, your provider may take a small sample of your tissue to look at under a microscope. This is called a biopsy.  Treatment for colitis   Treatment for colitis depends on what is causing it and how serious your symptoms are. In some cases, you may not need treatment. For example, colitis from an infection may go away without care.   Treatment may include:     Medicines. You may take  these by mouth (oral) or  as a rectal suppository or enema. They can lessen swelling and ease symptoms.    Changes in your diet. Some foods can make symptoms worse. Common triggers are milk, coffee, alcohol, and fried foods.    Surgery. In some cases, you may need surgery to remove a damaged part of the colon.     Call 911  Call 911 if any of these occur:    Trouble breathing    Confusion    Very drowsy or trouble awakening    Fainting or loss of consciousness    Rapid heart rate    Chest pain   When to call your healthcare provider   Call your healthcare provider right away if you have any of these:    Symptoms that don t get better, or get worse    Fever of 100.4 F (38 C) or higher, or as directed by your healthcare provider    Pain that gets worse    Bloody diarrhea    Bleeding from your rectum    New symptoms      Date Last Reviewed: 12/1/2017 2000-2018 [employer]. [address] [place] [address], [place] [address]. All rights reserved. This information is not intended as a substitute for professional medical care. Always follow your healthcare professional's instructions.
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
## 2020-02-25 ENCOUNTER — OFFICE VISIT (OUTPATIENT)
Dept: URGENT CARE | Facility: URGENT CARE | Age: 45
End: 2020-02-25
Payer: COMMERCIAL

## 2020-02-25 VITALS
SYSTOLIC BLOOD PRESSURE: 155 MMHG | WEIGHT: 189.8 LBS | OXYGEN SATURATION: 99 % | DIASTOLIC BLOOD PRESSURE: 84 MMHG | BODY MASS INDEX: 30.63 KG/M2 | HEART RATE: 96 BPM | RESPIRATION RATE: 18 BRPM | TEMPERATURE: 98.2 F

## 2020-02-25 DIAGNOSIS — R50.9 FEVER, UNSPECIFIED FEVER CAUSE: Primary | ICD-10-CM

## 2020-02-25 DIAGNOSIS — B34.9 VIRAL SYNDROME: ICD-10-CM

## 2020-02-25 LAB
DEPRECATED S PYO AG THROAT QL EIA: NEGATIVE
FLUAV+FLUBV AG SPEC QL: NEGATIVE
FLUAV+FLUBV AG SPEC QL: NEGATIVE
SPECIMEN SOURCE: NORMAL
STREP GROUP A PCR: NOT DETECTED

## 2020-02-25 PROCEDURE — 40001204 ZZHCL STATISTIC STREP A RAPID: Performed by: PHYSICIAN ASSISTANT

## 2020-02-25 PROCEDURE — 87804 INFLUENZA ASSAY W/OPTIC: CPT | Performed by: PHYSICIAN ASSISTANT

## 2020-02-25 PROCEDURE — 87651 STREP A DNA AMP PROBE: CPT | Performed by: PHYSICIAN ASSISTANT

## 2020-02-25 PROCEDURE — 99213 OFFICE O/P EST LOW 20 MIN: CPT | Performed by: PHYSICIAN ASSISTANT

## 2020-02-25 ASSESSMENT — ENCOUNTER SYMPTOMS
FEVER: 1
COUGH: 1
EYES NEGATIVE: 1
MUSCULOSKELETAL NEGATIVE: 1
NAUSEA: 1
DIARRHEA: 1
CARDIOVASCULAR NEGATIVE: 1
SORE THROAT: 1
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
## 2020-02-25 NOTE — PATIENT INSTRUCTIONS
"  Patient Education     Viral Syndrome (Adult)  A viral illness may cause a number of symptoms such as fever. Other symptoms depend on the part of the body that the virus affects. If it settles in your nose, throat, and lungs, it may cause cough, sore throat, congestion, runny nose, headache, earache and other ear symptoms, or shortness of breath. If it settles in your stomach and intestinal tract, it may cause nausea, vomiting, cramping, and diarrhea. Sometimes it causes generalized symptoms like \"aching all over,\" feeling tired, loss of energy, or loss of appetite.  A viral illness usually lasts anywhere from several days to several weeks, but sometimes it lasts longer. In some cases, a more serious infection can look like a viral syndrome in the first few days of the illness. You may need another exam and additional tests to know the difference. Watch for the warning signs listed below for when to seek medical advice.  Home care  Follow these guidelines for taking care of yourself at home:    If symptoms are severe, rest at home for the first 2 to 3 days.    Stay away from cigarette smoke - both your smoke and the smoke from others.    You may use over-the-counter acetaminophen or ibuprofen for fever, muscle aching, and headache, unless another medicine was prescribed for this. If you have chronic liver or kidney disease or ever had a stomach ulcer or gastrointestinal bleeding, talk with your healthcare provider before using these medicines. No one who is younger than 18 and ill with a fever should take aspirin. It may cause severe disease or death.    Your appetite may be poor, so a light diet is fine. Avoid dehydration by drinking 8 to 12, 8-ounce glasses of fluids each day. This may include water; orange juice; lemonade; apple, grape, and cranberry juice; clear fruit drinks; electrolyte replacement and sports drinks; and decaffeinated teas and coffee. If you have been diagnosed with a kidney disease, ask your " healthcare provider how much and what types of fluids you should drink to prevent dehydration. If you have kidney disease, drinking too much fluid can cause it build up in the your body and be dangerous to your health.    Over-the-counter remedies won't shorten the length of the illness but may be helpful for symptoms such as cough, sore throat, nasal and sinus congestion, or diarrhea. Don't use decongestants if you have high blood pressure.  Follow-up care  Follow up with your healthcare provider if you do not improve over the next week.  Call 911  Call 911 if any of the following occur:    Convulsion    Feeling weak, dizzy, or like you are going to faint    Chest pain, or more than mild shortness of breath  When to seek medical advice  Call your healthcare provider right away if any of these occur:    Cough with lots of colored sputum (mucus) or blood in your sputum    Chest pain, shortness of breath, wheezing, or trouble breathing    Severe headache; face, neck, or ear pain    Severe, constant pain in the lower right side of your belly (abdominal)    Continued vomiting (can t keep liquids down)    Frequent diarrhea (more than 5 times a day); blood (red or black color) or mucus in diarrhea    Feeling weak, dizzy, or like you are going to faint    Extreme thirst    Fever of 100.4 F (38 C) or higher, or as directed by your healthcare provider  Date Last Reviewed: 4/1/2018 2000-2019 [email or web address]. [address] [place] [address]. All rights reserved. This information is not intended as a substitute for professional medical care. Always follow your healthcare professional's instructions.

## 2020-02-25 NOTE — LETTER
[facility]  [address] [facility] [address]  Phone: [phone]    February 25, 2020        Minerva Thibodeaux  [address] [facility] [address]          To whom it may concern:    RE: Minerva Thibodeaux    Patient was seen and treated today at our clinic and missed work.  She will return to work on 2/27/20.      Please contact me for questions or concerns.      Sincerely,        Tawana Samayoa PA-C

## 2022-03-04 ENCOUNTER — VIRTUAL VISIT (OUTPATIENT)
Dept: FAMILY MEDICINE | Facility: CLINIC | Age: 47
End: 2022-03-04

## 2022-03-04 DIAGNOSIS — K52.9 GASTROENTERITIS: Primary | ICD-10-CM

## 2022-03-04 PROCEDURE — 99207 PR NO CHARGE LOS: CPT | Performed by: NURSE PRACTITIONER
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
## 2022-03-07 ENCOUNTER — OFFICE VISIT (OUTPATIENT)
Dept: URGENT CARE | Facility: URGENT CARE | Age: 47
End: 2022-03-07
Payer: COMMERCIAL

## 2022-03-07 VITALS
BODY MASS INDEX: 31.15 KG/M2 | TEMPERATURE: 98.5 F | DIASTOLIC BLOOD PRESSURE: 77 MMHG | OXYGEN SATURATION: 96 % | WEIGHT: 193 LBS | HEART RATE: 87 BPM | SYSTOLIC BLOOD PRESSURE: 137 MMHG

## 2022-03-07 DIAGNOSIS — A08.4 VIRAL GASTROENTERITIS: Primary | ICD-10-CM

## 2022-03-07 PROCEDURE — 99212 OFFICE O/P EST SF 10 MIN: CPT | Performed by: PHYSICIAN ASSISTANT

## 2022-03-07 ASSESSMENT — ENCOUNTER SYMPTOMS
ENDOCRINE NEGATIVE: 1
SORE THROAT: 0
VOMITING: 0
FEVER: 0
SHORTNESS OF BREATH: 0
CARDIOVASCULAR NEGATIVE: 1
HEADACHES: 0
ALLERGIC/IMMUNOLOGIC NEGATIVE: 1
EYES NEGATIVE: 1
RHINORRHEA: 0
COUGH: 0
RESPIRATORY NEGATIVE: 1
PALPITATIONS: 0
BACK PAIN: 0
CHILLS: 0
DIARRHEA: 0
JOINT SWELLING: 0
NAUSEA: 0
WEAKNESS: 0
ARTHRALGIAS: 0
NECK PAIN: 0
LIGHT-HEADEDNESS: 0
DIZZINESS: 0
NECK STIFFNESS: 0
WOUND: 0
MYALGIAS: 0
MUSCULOSKELETAL NEGATIVE: 1

## 2022-03-07 ASSESSMENT — PAIN SCALES - GENERAL: PAINLEVEL: NO PAIN (0)

## 2022-03-07 NOTE — LETTER
[facility] CARE [address] [address]          March 7, 2022    RE:  Minerva Thibodeaux                                                                                                                                                       [address] [facility] [address]            To whom it may concern:    Minerva Thibodeaux is under my professional care for Viral gastroenteritis She  may return to work with no restrictions her next available shift.    Sincerely,        Sony Mcduffie PA-C
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
## 2022-03-13 ENCOUNTER — HEALTH MAINTENANCE LETTER (OUTPATIENT)
Age: 47
End: 2022-03-13

## 2022-08-19 ENCOUNTER — IMMUNIZATION (OUTPATIENT)
Dept: NURSING | Facility: CLINIC | Age: 47
End: 2022-08-19
Payer: COMMERCIAL

## 2022-08-19 PROCEDURE — 0054A COVID-19,PF,PFIZER (12+ YRS): CPT

## 2022-08-19 PROCEDURE — 91305 COVID-19,PF,PFIZER (12+ YRS): CPT

## 2023-01-08 ENCOUNTER — HEALTH MAINTENANCE LETTER (OUTPATIENT)
Age: 48
End: 2023-01-08

## 2023-02-10 ENCOUNTER — LAB (OUTPATIENT)
Dept: FAMILY MEDICINE | Facility: CLINIC | Age: 48
End: 2023-02-10

## 2023-02-10 ENCOUNTER — VIRTUAL VISIT (OUTPATIENT)
Dept: FAMILY MEDICINE | Facility: CLINIC | Age: 48
End: 2023-02-10
Payer: COMMERCIAL

## 2023-02-10 DIAGNOSIS — Z12.11 SCREEN FOR COLON CANCER: ICD-10-CM

## 2023-02-10 DIAGNOSIS — J30.2 SEASONAL ALLERGIC RHINITIS, UNSPECIFIED TRIGGER: Primary | ICD-10-CM

## 2023-02-10 DIAGNOSIS — Z12.31 VISIT FOR SCREENING MAMMOGRAM: ICD-10-CM

## 2023-02-10 DIAGNOSIS — H10.13 ALLERGIC CONJUNCTIVITIS, BILATERAL: ICD-10-CM

## 2023-02-10 PROCEDURE — 99213 OFFICE O/P EST LOW 20 MIN: CPT | Mod: 93 | Performed by: PHYSICIAN ASSISTANT

## 2023-02-10 RX ORDER — OLOPATADINE HYDROCHLORIDE 1 MG/ML
1 SOLUTION/ DROPS OPHTHALMIC 2 TIMES DAILY
Qty: 5 ML | Refills: 11 | Status: SHIPPED | OUTPATIENT
Start: 2023-02-10 | End: 2024-05-03

## 2023-02-10 RX ORDER — FLUTICASONE PROPIONATE 50 MCG
1 SPRAY, SUSPENSION (ML) NASAL DAILY
COMMUNITY
End: 2024-05-03

## 2023-02-10 RX ORDER — FEXOFENADINE HCL 180 MG/1
180 TABLET ORAL DAILY
Qty: 90 TABLET | Refills: 3 | Status: SHIPPED | OUTPATIENT
Start: 2023-02-10 | End: 2024-05-03

## 2023-02-10 RX ORDER — FLUTICASONE PROPIONATE 50 MCG
1 SPRAY, SUSPENSION (ML) NASAL DAILY
Qty: 16 G | Refills: 11 | Status: SHIPPED | OUTPATIENT
Start: 2023-02-10 | End: 2024-03-04

## 2023-02-10 NOTE — PROGRESS NOTES
Minerva is a 47 year old who is being evaluated via a billable video visit.      How would you like to obtain your AVS? Mail a copy  If the video visit is dropped, the invitation should be resent by: Text to cell phone: [phone]  Will anyone else be joining your video visit? No      PHONE # [phone]    Assessment & Plan     Seasonal allergic rhinitis, unspecified trigger  Has failed claritin and zytec- improved with flonase - refilled and also add allegra- consider singulair in future if not relieved   - fexofenadine (ALLEGRA) 180 MG tablet  Dispense: 90 tablet; Refill: 3  - fluticasone (FLONASE) 50 MCG/ACT nasal spray  Dispense: 16 g; Refill: 11    Allergic conjunctivitis, bilateral  Prescription for patanol   - olopatadine (PATANOL) 0.1 % ophthalmic solution  Dispense: 5 mL; Refill: 11    Screen for colon cancer  Declines colonoscopy - agreeable to cologuard   [facility]    Visit for screening mammogram  Encouraged to schedule mammogram.   - MA SCREENING DIGITAL BILAT - Future  (s+30)    Encouraged to schedule pap and physical      Ordering of each unique test  Prescription drug management         Nicotine/Tobacco Cessation:  She reports that she has been smoking cigarettes. She has a 3.00 pack-year smoking history. She has never used smokeless tobacco.  Nicotine/Tobacco Cessation Plan:   Information offered: Patient not interested at this time      Patient Instructions   Use flonase daily  Try patanol eye drops for eye symptoms   Try allegra 180 mg daily for allergy symptoms   Follow up with primary if no improvement in symptoms over the next week.   Return urgently if any change in symptoms.    Mail in coluard for colon cancer screening.  If you do not receive this in the next 2 weeks please reach out to us.  Please schedule physical with fasting labs (nothing to eat or drink except water and/or medications 9 hours prior to appointment).    Schedule your mammogram at [facility]  [place] ([phone]) formerly called [facility].          Allergic Rhinitis  Allergic rhinitis is an allergic reaction that affects the nose, and often the eyes. It s often known as nasal allergies. Nasal allergies are often due to things in the environment that are breathed in. Depending what you are sensitive to, nasal allergies may occur only during certain seasons, or they may occur year round. Common indoor allergens include house dust mites, mold, cockroaches, and pet dander. Outdoor allergens include pollen from trees, grasses, and weeds.    Symptoms include a drippy, stuffy, and itchy nose. They also include sneezing and red and itchy eyes. You may feel tired more often. Severe allergies may also affect your breathing and trigger a condition called asthma.    Tests can be done to see what allergens are affecting you. You may be referred to an allergy specialist for testing and further evaluation.   Home care  Your healthcare provider may prescribe medicines to help relieve allergy symptoms. These may include oral medicines, nasal sprays, or eye drops.   Ask your provider for advice on how to stay away from substances that you are allergic to. Below are a few tips for each type of allergen.   Pet dander:    Do not have pets with fur and feathers.    If you have a pet, keep it out of your bedroom and off upholstered furniture.  Pollen:    When pollen counts are high, keep windows of your car and home closed. If possible, use an air conditioner instead.    Wear a filter mask when mowing or doing yard work.  House dust mites:    Wash bedding every week in warm water and detergent and dry on a hot setting.    Cover the mattress, box spring, and pillows with allergy covers.     If possible, sleep in a room with no carpet, curtains, or upholstered furniture.  Cockroaches:    Store food in sealed containers.    Remove garbage from the home promptly.    Fix water leaks.  Mold:    Keep humidity  low by using a dehumidifier or air conditioner. Keep the dehumidifier and air conditioner clean and free of mold.    Clean moldy areas with bleach and water. Don't mix bleach with other .  In general:    Vacuum once or twice a week. If possible, use a vacuum with a high-efficiency particulate air (HEPA) filter.    Don't smoke. Stay away from cigarette smoke. Cigarette smoke is an irritant that can make symptoms worse.  Follow-up care  Follow up as advised by the healthcare provider or our staff. If you were referred to an allergy specialist, make this appointment promptly.   When to seek medical advice  Call your healthcare provider or get medical care right away if the following occur:     Coughing    Fever of 100.4 F (38 C) or higher, or as directed by your healthcare provider    Raised red bumps (hives)    Continuing symptoms, new symptoms, or worsening symptoms  Call 911  Call 911 if you have:     Trouble breathing    Severe swelling of the face or severe itching of the eyes or mouth    Wheezing or shortness of breath    Chest tightness    Dizziness or lightheadedness    Feeling of doom    Stomach pain, bloating, vomiting, or diarrhea  [email or web address] last reviewed this educational content on 10/1/2019    5814-7975 [employer]. All rights reserved. This information is not intended as a substitute for professional medical care. Always follow your healthcare professional's instructions.            Return in about 1 week (around 2/17/2023), or if symptoms worsen or fail to improve, for in person.    BRAD Caraballo [facility] is a 47 year old, presenting for the following health issues:  Sinus Problem      History of Present Illness       Reason for visit:  Sinus issue ( wasnt bothering pt as much )  Symptom onset:  More than a month  Symptoms include:  Got a new dog, dog hair, face hurting, congestion  Symptom intensity:  Moderate  Symptom  progression:  Improving  Had these symptoms before:  Yes  Has tried/received treatment for these symptoms:  Yes  Previous treatment was successful:  Yes  Prior treatment description:  Nasal spray  What makes it worse:  Dog hair  What makes it better:  Nasal spray    She eats 2-3 servings of fruits and vegetables daily.She consumes 4 sweetened beverage(s) daily.She exercises with enough effort to increase her heart rate 9 or less minutes per day.  She exercises with enough effort to increase her heart rate 3 or less days per week.   She is taking medications regularly.    Patient unknown to me presents for treatment of rhinorrhea and sinus pain.  Got a  chihuaua - puppy at 6 weeks age 3 months ago.  Has a yorkie for 6 years.  Brother  on thanksgiving- last 3 months with rhinorrhea Nostrils sore from blowing nose.  Sinuses ache and eyes running.  Gotten better  flonase - couple yrs old.  Symptoms improved with flonase    Tried claritin and zyrtec before flonase without relief  Has not tried allegra.  No history of ashtma   First wake up Theresa and the worst - blow until get mucus out   Sinus pain till clear then copious rhinorrhea   20-30 minutes to get relief in AM.   Soreness nostrils from blowing   Going through 3 boxes kleenex in a week.  Has had some itchy watery eyes as well.           Review of Systems   Constitutional, HEENT, cardiovascular, pulmonary, gi and gu systems are negative, except as otherwise noted.      Objective           Vitals:  No vitals were obtained today due to virtual visit.    Physical Exam   Telephone visit  Patient is pleasant, upbeat, no cough, able to talk in complete sentences, no tangential thoughts or pressure of speech                Video-Visit Details    Type of service:  Video Visit - teelphone visit - patient unable to complete video   Video Start Time:   Video End Time:11:44 AM    Originating Location (pt. Location): Home    Distant Location (provider location):   On-site  Platform used for Video Visit: Unable to complete video visit

## 2023-02-11 NOTE — PATIENT INSTRUCTIONS
Use flonase daily  Try patanol eye drops for eye symptoms   Try allegra 180 mg daily for allergy symptoms   Follow up with primary if no improvement in symptoms over the next week.   Return urgently if any change in symptoms.    Mail in [facility] for colon cancer screening.  If you do not receive this in the next 2 weeks please reach out to us.  Please schedule physical with fasting labs (nothing to eat or drink except water and/or medications 9 hours prior to appointment).    Schedule your mammogram at [facility] ([phone]) formerly called [facility].          Allergic Rhinitis  Allergic rhinitis is an allergic reaction that affects the nose, and often the eyes. It s often known as nasal allergies. Nasal allergies are often due to things in the environment that are breathed in. Depending what you are sensitive to, nasal allergies may occur only during certain seasons, or they may occur year round. Common indoor allergens include house dust mites, mold, cockroaches, and pet dander. Outdoor allergens include pollen from trees, grasses, and weeds.    Symptoms include a drippy, stuffy, and itchy nose. They also include sneezing and red and itchy eyes. You may feel tired more often. Severe allergies may also affect your breathing and trigger a condition called asthma.    Tests can be done to see what allergens are affecting you. You may be referred to an allergy specialist for testing and further evaluation.   Home care  Your healthcare provider may prescribe medicines to help relieve allergy symptoms. These may include oral medicines, nasal sprays, or eye drops.   Ask your provider for advice on how to stay away from substances that you are allergic to. Below are a few tips for each type of allergen.   Pet dander:  Do not have pets with fur and feathers.  If you have a pet, keep it out of your bedroom and off upholstered furniture.  Pollen:  When pollen counts are high, keep  windows of your car and home closed. If possible, use an air conditioner instead.  Wear a filter mask when mowing or doing yard work.  House dust mites:  Wash bedding every week in warm water and detergent and dry on a hot setting.  Cover the mattress, box spring, and pillows with allergy covers.   If possible, sleep in a room with no carpet, curtains, or upholstered furniture.  Cockroaches:  Store food in sealed containers.  Remove garbage from the home promptly.  Fix water leaks.  Mold:  Keep humidity low by using a dehumidifier or air conditioner. Keep the dehumidifier and air conditioner clean and free of mold.  Clean moldy areas with bleach and water. Don't mix bleach with other .  In general:  Vacuum once or twice a week. If possible, use a vacuum with a high-efficiency particulate air (HEPA) filter.  Don't smoke. Stay away from cigarette smoke. Cigarette smoke is an irritant that can make symptoms worse.  Follow-up care  Follow up as advised by the healthcare provider or our staff. If you were referred to an allergy specialist, make this appointment promptly.   When to seek medical advice  Call your healthcare provider or get medical care right away if the following occur:   Coughing  Fever of 100.4 F (38 C) or higher, or as directed by your healthcare provider  Raised red bumps (hives)  Continuing symptoms, new symptoms, or worsening symptoms  Call 911  Call 911 if you have:   Trouble breathing  Severe swelling of the face or severe itching of the eyes or mouth  Wheezing or shortness of breath  Chest tightness  Dizziness or lightheadedness  Feeling of doom  Stomach pain, bloating, vomiting, or diarrhea  [employer] last reviewed this educational content on 10/1/2019    1923-5036 [employer]. All rights reserved. This information is not intended as a substitute for professional medical care. Always follow your healthcare professional's instructions.

## 2023-03-20 ENCOUNTER — OFFICE VISIT (OUTPATIENT)
Dept: URGENT CARE | Facility: URGENT CARE | Age: 48
End: 2023-03-20
Payer: COMMERCIAL

## 2023-03-20 VITALS
WEIGHT: 197 LBS | HEART RATE: 77 BPM | SYSTOLIC BLOOD PRESSURE: 114 MMHG | DIASTOLIC BLOOD PRESSURE: 75 MMHG | OXYGEN SATURATION: 99 % | BODY MASS INDEX: 31.8 KG/M2 | TEMPERATURE: 98.4 F

## 2023-03-20 DIAGNOSIS — R09.81 NASAL CONGESTION: Primary | ICD-10-CM

## 2023-03-20 PROCEDURE — 99213 OFFICE O/P EST LOW 20 MIN: CPT | Performed by: FAMILY MEDICINE

## 2023-03-20 RX ORDER — PREDNISONE 20 MG/1
40 TABLET ORAL DAILY
Qty: 10 TABLET | Refills: 0 | Status: SHIPPED | OUTPATIENT
Start: 2023-03-20 | End: 2023-03-25

## 2023-03-20 NOTE — PATIENT INSTRUCTIONS
I sent in a referral for ENT  They will call you about appointment.    I sent in the steroids for 5 days to see if helps clear it up.

## 2023-03-20 NOTE — PROGRESS NOTES
Assessment & Plan     Nasal congestion  See below  - predniSONE (DELTASONE) 20 MG tablet  Dispense: 10 tablet; Refill: 0  - Adult ENT  Referral             No follow-ups on file.    Buster Nicole MD  [facility] [place] NAVI Palma is a 47 year old female who presents to clinic today for the following health issues:  Chief Complaint   Patient presents with     Nasal Polyps     HPI    Here with constant runny nose and also believes sinus issue. Started November.  Having some bloody nose.  Was started on medicine for the nose by her provider  Flonase was started but didn't help and used more than prescribed.  Not taking allegra-no insurance coverage.        Review of Systems        Objective    /75   Pulse 77   Temp 98.4  F (36.9  C) (Tympanic)   Wt 89.4 kg (197 lb)   LMP 12/11/2010   SpO2 99%   BMI 31.80 kg/m    Physical Exam  Vitals and nursing note reviewed.   Constitutional:       Appearance: Normal appearance.   HENT:      Right Ear: Tympanic membrane normal.      Left Ear: Tympanic membrane normal.      Nose: Nose normal.   Eyes:      Pupils: Pupils are equal, round, and reactive to light.   Cardiovascular:      Rate and Rhythm: Normal rate and regular rhythm.      Pulses: Normal pulses.      Heart sounds: Normal heart sounds.   Pulmonary:      Effort: Pulmonary effort is normal.      Breath sounds: Normal breath sounds.   Musculoskeletal:      Cervical back: Neck supple.   Neurological:      Mental Status: She is alert.

## 2023-03-20 NOTE — LETTER
March 20, 2023      To Whom It May Concern:      Minerva Thibodeaux was seen in our urgent care and missed work    Sincerely,        Buster Nicole MD

## 2023-04-23 ENCOUNTER — HEALTH MAINTENANCE LETTER (OUTPATIENT)
Age: 48
End: 2023-04-23

## 2024-03-04 DIAGNOSIS — J30.2 SEASONAL ALLERGIC RHINITIS, UNSPECIFIED TRIGGER: ICD-10-CM

## 2024-03-04 RX ORDER — FLUTICASONE PROPIONATE 50 MCG
1 SPRAY, SUSPENSION (ML) NASAL DAILY
Qty: 16 G | Refills: 11 | Status: SHIPPED | OUTPATIENT
Start: 2024-03-04

## 2024-05-03 ENCOUNTER — VIRTUAL VISIT (OUTPATIENT)
Dept: FAMILY MEDICINE | Facility: CLINIC | Age: 49
End: 2024-05-03
Payer: COMMERCIAL

## 2024-05-03 DIAGNOSIS — N95.1 MENOPAUSAL SYNDROME (HOT FLASHES): Primary | ICD-10-CM

## 2024-05-03 DIAGNOSIS — F11.90 CHRONIC, CONTINUOUS USE OF OPIOIDS: ICD-10-CM

## 2024-05-03 DIAGNOSIS — Z11.3 SCREEN FOR STD (SEXUALLY TRANSMITTED DISEASE): ICD-10-CM

## 2024-05-03 PROCEDURE — 99214 OFFICE O/P EST MOD 30 MIN: CPT | Mod: 95 | Performed by: FAMILY MEDICINE

## 2024-05-03 RX ORDER — VENLAFAXINE HYDROCHLORIDE 37.5 MG/1
37.5 CAPSULE, EXTENDED RELEASE ORAL DAILY
Qty: 30 CAPSULE | Refills: 0 | Status: SHIPPED | OUTPATIENT
Start: 2024-05-03 | End: 2024-07-23

## 2024-05-03 NOTE — PROGRESS NOTES
"Minerva is a 48 year old who is being evaluated via a billable video visit.    How would you like to obtain your AVS? [email or web address]  If the video visit is dropped, the invitation should be resent by: Text to cell phone: [phone]  Will anyone else be joining your video visit? No        Instructions Relayed to Patient by Virtual Roomer:     Patient is active on [employer]:   Relayed following to patient: \"It looks like you are active on [employer], are you able to join the visit this way? If not, do you need us to send you a link now or would you like your provider to send a link via text or email when they are ready to initiate the visit?\"      Patient Confirmed they will join visit via: [email or web address]  Reminded patient to ensure they were logged on to virtual visit by arrival time listed.   Asked if patient has flexibility to initiate visit sooner than arrival time: patient stated yes, documented in appointment notes availability to initiate visit earlier than arrival time     If pediatric virtual visit, ensured pediatric patient along with parent/guardian will be present for video visit.     Patient offered the website [email or web address] and/or phone number to [employer] Help line: [phone]   Assessment & Plan     Menopausal syndrome (hot flashes)  Patient known to me though I have not seen her in about 10 years.  Interval history reviewed with her and in her chart.  For the past 6 months she has been having some significant hot flash symptoms.  These come and go without any provocation.  She is status post hysterectomy in 2011 but ovaries are preserved.  Has not had significant changes in weight or GI function or similar.  Discussed ways that we can manage hot flash symptoms.  We discussed that lab work might not be a bad idea to take a look at a secondary cause such as thyroid abnormality, and we can certainly take a look at FSH and LH but this would not necessarily predict when true menopause will happen.  " She is currently on gabapentin through an outside provider.  Cannot have hormone replacement because she is a smoker.  The addition of venlafaxine may help cut down on some of the symptoms.  Discussed mechanism of action of the proposed medication, as well as potential effects, both good and bad.  Patient expressed understanding and agreed with treatment.   - TSH with free T4 reflex; Future  - Luteinizing Hormone; Future  - Follicle stimulating hormone; Future  - venlafaxine (EFFEXOR XR) 37.5 MG 24 hr capsule; Take 1 capsule (37.5 mg) by mouth daily    Screen for STD (sexually transmitted disease)  Is going through divorce and wants to be screened for STDs as she is concerned about infidelity from her .  - NEISSERIA GONORRHOEA PCR; Future  - CHLAMYDIA TRACHOMATIS PCR; Future  - HIV Antigen Antibody Combo Cascade; Future  - Treponema Abs w Reflex to RPR and Titer; Future    Chronic, continuous use of opioids  Database reviewed.  Is on oxycodone and gabapentin through an outside provider.      Nicotine/Tobacco Cessation  She reports that she has been smoking cigarettes. She has a 3 pack-year smoking history. She has never used smokeless tobacco.  Nicotine/Tobacco Cessation Plan  Information offered: Patient not interested at this time        See Patient Instructions    Roya Palma is a 48 year old, presenting for the following health issues:  Menopausal Sx        5/3/2024    10:32 AM   Additional Questions   Roomed by Germaine   Accompanied by self       Video Start Time:  1115    History of Present Illness       Back Pain:  She presents for follow up of back pain. Patient's back pain is a chronic problem.           Reason for visit:  Hot flashes  Symptom onset:  More than a month  Symptoms include:  Serverly over heated daily and all throughout the day  Symptom intensity:  Severe  Symptom progression:  Staying the same  Had these symptoms before:  No  What makes it worse:  No  What makes it better:  Drake  air    She eats 2-3 servings of fruits and vegetables daily.She consumes 5 sweetened beverage(s) daily.She exercises with enough effort to increase her heart rate 30 to 60 minutes per day.  She exercises with enough effort to increase her heart rate 4 days per week.   She is taking medications regularly.       Concern - Hot flashes   Onset: 1 month   Description: hot flashes come and go throughout the day   Intensity: moderate  Progression of Symptoms:  worsening  Accompanying Signs & Symptoms: no  Previous history of similar problem: n/a  Precipitating factors:        Worsened by: no  Alleviating factors:        Improved by: no  Therapies tried and outcome: fan air     Video visit with patient today to talk about hot flashes as above.      Review of Systems  Constitutional, HEENT, cardiovascular, pulmonary, gi and gu systems are negative, except as otherwise noted.      Objective           Vitals:  No vitals were obtained today due to virtual visit.    Physical Exam   GENERAL: alert and no distress  EYES: Eyes grossly normal to inspection.  No discharge or erythema, or obvious scleral/conjunctival abnormalities.  RESP: No audible wheeze, cough, or visible cyanosis.    SKIN: Visible skin clear. No significant rash, abnormal pigmentation or lesions.  NEURO: Cranial nerves grossly intact.  Mentation and speech appropriate for age.  PSYCH: Appropriate affect, tone, and pace of words    Past labs reviewed with the patient.       Video-Visit Details    Type of service:  Video Visit   Video End Time: 1122  Originating Location (pt. Location): Home    Distant Location (provider location):  Off-site  Platform used for Video Visit: Lula  Signed Electronically by: Viri Talamantes MD

## 2024-06-29 ENCOUNTER — HEALTH MAINTENANCE LETTER (OUTPATIENT)
Age: 49
End: 2024-06-29

## 2024-07-23 ENCOUNTER — OFFICE VISIT (OUTPATIENT)
Dept: FAMILY MEDICINE | Facility: CLINIC | Age: 49
End: 2024-07-23
Payer: COMMERCIAL

## 2024-07-23 ENCOUNTER — LAB (OUTPATIENT)
Dept: LAB | Facility: CLINIC | Age: 49
End: 2024-07-23
Payer: COMMERCIAL

## 2024-07-23 VITALS
WEIGHT: 179.3 LBS | TEMPERATURE: 97.8 F | SYSTOLIC BLOOD PRESSURE: 117 MMHG | OXYGEN SATURATION: 97 % | RESPIRATION RATE: 20 BRPM | BODY MASS INDEX: 28.82 KG/M2 | HEIGHT: 66 IN | DIASTOLIC BLOOD PRESSURE: 80 MMHG | HEART RATE: 64 BPM

## 2024-07-23 DIAGNOSIS — Z11.59 NEED FOR HEPATITIS C SCREENING TEST: Primary | ICD-10-CM

## 2024-07-23 DIAGNOSIS — A59.01 TRICHOMONAS VAGINALIS (TV) INFECTION: Primary | ICD-10-CM

## 2024-07-23 DIAGNOSIS — N89.8 VAGINAL DISCHARGE: Primary | ICD-10-CM

## 2024-07-23 DIAGNOSIS — N95.1 MENOPAUSAL SYNDROME (HOT FLASHES): ICD-10-CM

## 2024-07-23 DIAGNOSIS — Z12.11 SCREENING FOR COLON CANCER: ICD-10-CM

## 2024-07-23 DIAGNOSIS — Z11.3 SCREEN FOR STD (SEXUALLY TRANSMITTED DISEASE): ICD-10-CM

## 2024-07-23 DIAGNOSIS — Z13.220 SCREENING FOR HYPERLIPIDEMIA: ICD-10-CM

## 2024-07-23 DIAGNOSIS — K61.0 ABSCESS, PERIANAL: ICD-10-CM

## 2024-07-23 DIAGNOSIS — Z13.1 SCREENING FOR DIABETES MELLITUS: ICD-10-CM

## 2024-07-23 LAB
CHOLEST SERPL-MCNC: 188 MG/DL
CLUE CELLS: ABNORMAL
FASTING STATUS PATIENT QL REPORTED: YES
FASTING STATUS PATIENT QL REPORTED: YES
FSH SERPL IRP2-ACNC: 76.2 MIU/ML
GLUCOSE SERPL-MCNC: 122 MG/DL (ref 70–99)
HCV AB SERPL QL IA: NONREACTIVE
HDLC SERPL-MCNC: 52 MG/DL
HIV 1+2 AB+HIV1 P24 AG SERPL QL IA: NONREACTIVE
LDLC SERPL CALC-MCNC: 124 MG/DL
LH SERPL-ACNC: 43.8 MIU/ML
NONHDLC SERPL-MCNC: 136 MG/DL
TRICHOMONAS, WET PREP: PRESENT
TRIGL SERPL-MCNC: 59 MG/DL
TSH SERPL DL<=0.005 MIU/L-ACNC: 0.69 UIU/ML (ref 0.3–4.2)
WBC'S/HIGH POWER FIELD, WET PREP: ABNORMAL
YEAST, WET PREP: ABNORMAL

## 2024-07-23 PROCEDURE — 83002 ASSAY OF GONADOTROPIN (LH): CPT

## 2024-07-23 PROCEDURE — 84443 ASSAY THYROID STIM HORMONE: CPT

## 2024-07-23 PROCEDURE — 83001 ASSAY OF GONADOTROPIN (FSH): CPT

## 2024-07-23 PROCEDURE — 36415 COLL VENOUS BLD VENIPUNCTURE: CPT

## 2024-07-23 PROCEDURE — 87210 SMEAR WET MOUNT SALINE/INK: CPT | Performed by: INTERNAL MEDICINE

## 2024-07-23 PROCEDURE — 99213 OFFICE O/P EST LOW 20 MIN: CPT | Performed by: INTERNAL MEDICINE

## 2024-07-23 PROCEDURE — 86780 TREPONEMA PALLIDUM: CPT

## 2024-07-23 PROCEDURE — 86803 HEPATITIS C AB TEST: CPT

## 2024-07-23 PROCEDURE — 87591 N.GONORRHOEAE DNA AMP PROB: CPT

## 2024-07-23 PROCEDURE — 87491 CHLMYD TRACH DNA AMP PROBE: CPT

## 2024-07-23 PROCEDURE — 82947 ASSAY GLUCOSE BLOOD QUANT: CPT

## 2024-07-23 PROCEDURE — 80061 LIPID PANEL: CPT

## 2024-07-23 PROCEDURE — 87389 HIV-1 AG W/HIV-1&-2 AB AG IA: CPT

## 2024-07-23 RX ORDER — METRONIDAZOLE 500 MG/1
500 TABLET ORAL 3 TIMES DAILY
Qty: 21 TABLET | Refills: 0 | Status: SHIPPED | OUTPATIENT
Start: 2024-07-23 | End: 2024-07-30

## 2024-07-23 RX ORDER — GABAPENTIN 800 MG/1
TABLET ORAL
COMMUNITY
Start: 2024-07-22

## 2024-07-23 RX ORDER — MELOXICAM 15 MG/1
TABLET ORAL
COMMUNITY
Start: 2024-07-22

## 2024-07-23 RX ORDER — OXYCODONE HYDROCHLORIDE 15 MG/1
TABLET ORAL
COMMUNITY

## 2024-07-23 ASSESSMENT — PATIENT HEALTH QUESTIONNAIRE - PHQ9
SUM OF ALL RESPONSES TO PHQ QUESTIONS 1-9: 6
SUM OF ALL RESPONSES TO PHQ QUESTIONS 1-9: 6
10. IF YOU CHECKED OFF ANY PROBLEMS, HOW DIFFICULT HAVE THESE PROBLEMS MADE IT FOR YOU TO DO YOUR WORK, TAKE CARE OF THINGS AT HOME, OR GET ALONG WITH OTHER PEOPLE: SOMEWHAT DIFFICULT

## 2024-07-23 ASSESSMENT — ANXIETY QUESTIONNAIRES
GAD7 TOTAL SCORE: 4
4. TROUBLE RELAXING: SEVERAL DAYS
6. BECOMING EASILY ANNOYED OR IRRITABLE: SEVERAL DAYS
1. FEELING NERVOUS, ANXIOUS, OR ON EDGE: NOT AT ALL
3. WORRYING TOO MUCH ABOUT DIFFERENT THINGS: SEVERAL DAYS
8. IF YOU CHECKED OFF ANY PROBLEMS, HOW DIFFICULT HAVE THESE MADE IT FOR YOU TO DO YOUR WORK, TAKE CARE OF THINGS AT HOME, OR GET ALONG WITH OTHER PEOPLE?: SOMEWHAT DIFFICULT
IF YOU CHECKED OFF ANY PROBLEMS ON THIS QUESTIONNAIRE, HOW DIFFICULT HAVE THESE PROBLEMS MADE IT FOR YOU TO DO YOUR WORK, TAKE CARE OF THINGS AT HOME, OR GET ALONG WITH OTHER PEOPLE: SOMEWHAT DIFFICULT
7. FEELING AFRAID AS IF SOMETHING AWFUL MIGHT HAPPEN: SEVERAL DAYS
GAD7 TOTAL SCORE: 4
2. NOT BEING ABLE TO STOP OR CONTROL WORRYING: NOT AT ALL
5. BEING SO RESTLESS THAT IT IS HARD TO SIT STILL: NOT AT ALL
GAD7 TOTAL SCORE: 4
7. FEELING AFRAID AS IF SOMETHING AWFUL MIGHT HAPPEN: SEVERAL DAYS

## 2024-07-23 ASSESSMENT — PAIN SCALES - GENERAL: PAINLEVEL: NO PAIN (0)

## 2024-07-23 NOTE — PROGRESS NOTES
"  Assessment & Plan     1.  Perianal abscess suspected at about 3 o'clock position.  There is a palpable mostly nontender subcutaneous mass measuring maybe a centimeter and a half.  But no fluctuation so I did not think I&D was necessary.  Recommend a short trial of maintaining 875/125 twice daily for 10 days.  If it does not resolve I will refer to surgeon.  2.  Vaginal discharge.  Wet prep performed.  I will get back to with results.  Vaginal exam otherwise look normal.  3.  Screening for colon cancer.  Recommend screening colonoscopy which she agreed to.  Orders placed.    BMI  Estimated body mass index is 28.94 kg/m  as calculated from the following:    Height as of this encounter: 1.676 m (5' 6\").    Weight as of this encounter: 81.3 kg (179 lb 4.8 oz).       Roya Palma is a 48 year old, presenting for the following health issues:  Derm Problem (Boil in between legs. Had STD panel ordered by Dr. Talamantes but would like to get tests she noticed were missing from ordered panel like trichomonas, bacteria) and Medication Problem (Needs flonase nasal spray prescription changed to what she normally takes)        7/23/2024     2:25 PM   Additional Questions   Roomed by Mi SANTOS   Accompanied by Self         7/23/2024     2:25 PM   Patient Reported Additional Medications   Patient reports taking the following new medications None     History of Present Illness       Reason for visit:  Full std testing and a boil between my legs  Symptom onset:  More than a month  Symptom intensity:  Moderate  Symptom progression:  Improving  Had these symptoms before:  Yes  Has tried/received treatment for these symptoms:  Yes  Previous treatment was successful:  No  What makes it worse:  Touchinh it    She eats 2-3 servings of fruits and vegetables daily.She consumes 7 sweetened beverage(s) daily.She exercises with enough effort to increase her heart rate 10 to 19 minutes per day.  She exercises with enough effort to " "increase her heart rate 3 or less days per week.   She is taking medications regularly.     48-year-old young lady states that she noticed a lump or boil left side of her anal area.  It was not painful and initially it felt like firm lump.  Then it got a little tender.  She tried to squeeze it but unable to get any drainage.  In the past few days it seems like it is getting better.  It is smaller now but she can still palpate it.  No fever or chills.  No vaginal discharge or vaginal bleeding.  She is also requesting a checkup for lower trichomonas, bacterial or fungal vaginal infection since she has some vaginal discharge.    Review of Systems  Constitutional, HEENT, cardiovascular, pulmonary, GI, , musculoskeletal, neuro, skin, endocrine and psych systems are negative, except as otherwise noted.      Objective    /80 (BP Location: Right arm, Patient Position: Sitting, Cuff Size: Adult Regular)   Pulse 64   Temp 97.8  F (36.6  C) (Tympanic)   Resp 20   Ht 1.676 m (5' 6\")   Wt 81.3 kg (179 lb 4.8 oz)   LMP 12/11/2010   SpO2 97%   BMI 28.94 kg/m    Body mass index is 28.94 kg/m .  Physical Exam   GENERAL: alert and no distress   (female) w/bimanual: normal female external genitalia, normal urethral meatus, normal vaginal mucosa, no excessive discharge.  RECTAL: Perirectal area at the 3 o'clock position left buttocks I can feel very discrete round about 1.5 cm lump.  Slightly visible but palpable underneath the skin.  There is no fluctuation of the lump.  It is not tender to touch.  Feels like a resolving boil.        Signed Electronically by: Bruce Moncada MD    "

## 2024-07-24 LAB
C TRACH DNA SPEC QL NAA+PROBE: NEGATIVE
N GONORRHOEA DNA SPEC QL NAA+PROBE: NEGATIVE
T PALLIDUM AB SER QL: NONREACTIVE

## 2024-07-24 NOTE — RESULT ENCOUNTER NOTE
Minerva,  Overall your lab work looks pretty good.  Cholesterol level looks just fine.  Not sure if this was a fully fasting sample, but the glucose was mildly out of the normal range.  I do not think it has anything to worry about, but of course a healthy diet and plenty of exercise is always good.  In looking at those hormone labs I think you have moved into menopause.  So that probably fits with the hot flashes and hopefully those are doing a bit better.  STEVO Talamantes M.D. 
CBC, BMP, T&S ordered as indicated.  MRSA/MSSA PCR ordered as indicated.   COVID 19 PCR obtained today for sedated CT scan. Mother aware repeat COVID testing is needed prior to surgical date on 5/3/22.

## 2024-08-15 ENCOUNTER — TELEPHONE (OUTPATIENT)
Dept: GASTROENTEROLOGY | Facility: CLINIC | Age: 49
End: 2024-08-15
Payer: COMMERCIAL

## 2024-08-15 NOTE — TELEPHONE ENCOUNTER
"[facility] with Taisha on 10/9    Endoscopy Scheduling Screen    Have you had a positive Covid test in the last 14 days?  No    What is your communication preference for Instructions and/or Bowel Prep?   MyChart    What insurance is in the chart?  Other:  [employer]/[place]    Ordering/Referring Provider: Bruce Moncada MD in [facility]     (If ordering provider performs procedure, schedule with ordering provider unless otherwise instructed. )    BMI: Estimated body mass index is 28.94 kg/m  as calculated from the following:    Height as of 7/23/24: 1.676 m (5' 6\").    Weight as of 7/23/24: 81.3 kg (179 lb 4.8 oz).     Sedation Ordered  moderate sedation.   If patient BMI > 50 do not schedule in [facility].    If patient BMI > 45 do not schedule at [facility].    Are you taking methadone or Suboxone?  No    Have you had difficulties, pain, or discomfort during past endoscopy procedures?  No    Are you taking any prescription medications for pain 3 or more times per week?   YES, RN review needed to determine if MAC is required.  (RN Review required.)     Do you have a history of malignant hyperthermia?  No    (Females) Are you currently pregnant?   No     Have you been diagnosed or told you have pulmonary hypertension?   No    Do you have an LVAD?  No    Have you been told you have moderate to severe sleep apnea?  No    Have you been told you have COPD, asthma, or any other lung disease?  No    Do you have any heart conditions?  No     Have you ever had or are you waiting for an organ transplant?  No. Continue scheduling, no site restrictions.    Have you had a stroke or transient ischemic attack (TIA aka \"mini stroke\" in the last 6 months?   No    Have you been diagnosed with or been told you have cirrhosis of the liver?   No    Are you currently on dialysis?   No    Do you need assistance transferring?   No    BMI: Estimated body mass index is 28.94 kg/m  as calculated from the following:    Height as of 7/23/24: 1.676 m (5' 6\").    " Weight as of 7/23/24: 81.3 kg (179 lb 4.8 oz).     Is patients BMI > 40 and scheduling location [facility]?  No    Do you take an injectable medication for weight loss or diabetes (excluding insulin)?  No    Do you take the medication Naltrexone?  No    Do you take blood thinners?  No       Prep   Are you currently on dialysis or do you have chronic kidney disease?  No    Do you have a diagnosis of diabetes?  No    Do you have a diagnosis of cystic fibrosis (CF)?  No    On a regular basis do you go 3 -5 days between bowel movements?  Yes (Extended Prep)    BMI > 40?  No    Preferred Pharmacy:      [facility] [id number] [place] [facility] [place] - [address]  [address] [place] [address]  Phone: [phone] Fax: [phone]      Final Scheduling Details     Procedure scheduled  Colonoscopy    Surgeon:  THELMA     Date of procedure:  10/9     Pre-OP / PAC:   No - Not required for this site.    Location  [facility] - Patient preference.    Sedation   MAC/Deep Sedation - Per exclusion criteria.      Patient Reminders:   You will receive a call from a Nurse to review instructions and health history.  This assessment must be completed prior to your procedure.  Failure to complete the Nurse assessment may result in the procedure being cancelled.      On the day of your procedure, please designate an adult(s) who can drive you home stay with you for the next 24 hours. The medicines used in the exam will make you sleepy. You will not be able to drive.      You cannot take public transportation, ride share services, or non-medical taxi service without a responsible caregiver.  Medical transport services are allowed with the requirement that a responsible caregiver will receive you at your destination.  We require that drivers and caregivers are confirmed prior to your procedure.

## 2024-08-29 ENCOUNTER — NURSE TRIAGE (OUTPATIENT)
Dept: NURSING | Facility: CLINIC | Age: 49
End: 2024-08-29
Payer: COMMERCIAL

## 2024-08-30 ENCOUNTER — OFFICE VISIT (OUTPATIENT)
Dept: URGENT CARE | Facility: URGENT CARE | Age: 49
End: 2024-08-30
Payer: COMMERCIAL

## 2024-08-30 VITALS
DIASTOLIC BLOOD PRESSURE: 81 MMHG | HEART RATE: 90 BPM | TEMPERATURE: 98 F | RESPIRATION RATE: 18 BRPM | WEIGHT: 180.7 LBS | BODY MASS INDEX: 29.17 KG/M2 | OXYGEN SATURATION: 97 % | SYSTOLIC BLOOD PRESSURE: 124 MMHG

## 2024-08-30 DIAGNOSIS — L02.92 BOIL: Primary | ICD-10-CM

## 2024-08-30 PROCEDURE — 10060 I&D ABSCESS SIMPLE/SINGLE: CPT | Performed by: PHYSICIAN ASSISTANT

## 2024-08-30 PROCEDURE — 87070 CULTURE OTHR SPECIMN AEROBIC: CPT | Performed by: PHYSICIAN ASSISTANT

## 2024-08-30 RX ORDER — DOXYCYCLINE HYCLATE 100 MG
100 TABLET ORAL 2 TIMES DAILY
Qty: 14 TABLET | Refills: 0 | Status: SHIPPED | OUTPATIENT
Start: 2024-08-30 | End: 2024-09-06

## 2024-08-30 ASSESSMENT — ENCOUNTER SYMPTOMS
RESPIRATORY NEGATIVE: 1
HEADACHES: 0
MUSCULOSKELETAL NEGATIVE: 1
WOUND: 0
ENDOCRINE NEGATIVE: 1
DIARRHEA: 0
EYES NEGATIVE: 1
ROS SKIN COMMENTS: SKIN MASS
DIZZINESS: 0
NECK STIFFNESS: 0
ARTHRALGIAS: 0
NECK PAIN: 0
SORE THROAT: 0
NAUSEA: 0
ALLERGIC/IMMUNOLOGIC NEGATIVE: 1
COUGH: 0
MYALGIAS: 0
SHORTNESS OF BREATH: 0
FEVER: 0
WEAKNESS: 0
VOMITING: 0
RHINORRHEA: 0
BACK PAIN: 0
CARDIOVASCULAR NEGATIVE: 1
CHILLS: 0
LIGHT-HEADEDNESS: 0
PALPITATIONS: 0
JOINT SWELLING: 0

## 2024-08-30 NOTE — LETTER
August 30, 2024      Minerva Thibodeaux  [address]  [place] [address]        To Whom It May Concern:    Minerva Thibodeaux  was seen on 8/30/2024.  Please excuse her  until fever free due to illness.        Sincerely,        Sony Mcduffie PA-C

## 2024-08-30 NOTE — LETTER
September 3, 2024      Minerva Thibodeaux  [address]  [place] [address]        To Whom It May Concern:    Minerva Thibodeaux was seen in our clinic. She may return to work 9/4/2024 without restrictions.      Sincerely,        Lee Ann Meyers PA-C

## 2024-08-30 NOTE — TELEPHONE ENCOUNTER
Nurse Triage SBAR    Is this a 2nd Level Triage? YES, LICENSED PRACTITIONER REVIEW IS REQUIRED    Situation: Pt calling with concerns for worsening abscess.    Background: Pt was seen on 7/23 for sed abscess where she was prescribed abx and told to come back if it does not get better.    Assessment: Pt states abscess is now the size of a golf ball. It is painful, black, red, and blistering. She does not have a fever.     Protocol Recommended Disposition:   Go to ED Now (Or PCP Triage)    Recommendation: Provider consult indicated.     Reason for page: 2LT    Page sent to Dr. Power by RN at 2020.     Provider Recommendation Follow Up: Be seen within the next 24 hours. If symptoms of chills, fever, or malaise occur, go to the ER.     Reached patient/caregiver. Informed of provider's recommendations. Patient verbalized understanding and agrees with the plan.     Reason for Disposition   Black (necrotic) color or blisters develop in wound    Additional Information   Negative: [1] Widespread rash AND [2] bright red, sunburn-like AND [3] too weak to stand   Negative: Sounds like a life-threatening emergency to the triager   Negative: Painful lump or swelling at opening to anus (rectum)   Negative: Painful lump or swelling at opening to vagina (on labia)   Negative: Painful lump or swelling on scrotum   Negative: Impetigo suspected or diagnosed   Negative: Doesn't match the SYMPTOMS of a boil   Negative: MRSA, questions about (No boil or other skin lesion)   Negative: Widespread red rash   Negative: Patient sounds very sick or weak to the triager    Protocols used: Boil (Skin Abscess)-JONATHAN-SHAHBAZ Jain RN  [facility] Nurse Advisor   8/29/2024  8:14 PM
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
## 2024-09-01 LAB — BACTERIA ABSC ANAEROBE+AEROBE CULT: NORMAL

## 2024-09-03 ENCOUNTER — TELEPHONE (OUTPATIENT)
Dept: FAMILY MEDICINE | Facility: CLINIC | Age: 49
End: 2024-09-03
Payer: COMMERCIAL

## 2024-09-03 NOTE — TELEPHONE ENCOUNTER
Forms/Letter Request    Type of form/letter: Work Need a return to work letter.    Have you been seen for this request: Yes was seen at [facility] with Dr. Sony Mcduffie on 8/30/24    Do we have the form/letter: No    When is form/letter needed by: asap, needs to return back to work tomorrow (9/4/24)    How would you like the form/letter returned: , call patient and she can  today.     Patient Notified form requests are processed in 3-5 business days:Yes    Could we send this information to you in [facility] [employer] or would you prefer to receive a phone call?:   Patient would prefer a phone call   Okay to leave a detailed message?: Yes at Cell number on file:    Telephone Information:   Mobile [phone]

## 2024-09-03 NOTE — TELEPHONE ENCOUNTER
Called to speak with patient regarding hasdje-lq-rosq note. Patient states she needs a note stating she may return to work tomorrow, 9/4/2024.     Patient informed that she has a note in [employer] from provider who she saw in  on 8/30, but patient states her boss needs it to say that she may return to work without restrictions on 9/4.     Patient states she cannot access [employer]. Writer assisted patient in re-setting [employer] password. Patient states she is now able to access [employer] and would like the letter to be placed in [employer].    Informed patient that this message will be forwarded to provider to complete when able.    Patient voiced understanding and had no further questions or needs.    Julius Richards LPN

## 2024-09-03 NOTE — TELEPHONE ENCOUNTER
REFERRAL INFORMATION:  Referring Provider: TITUS Vázquez  Referring Clinic: [facility]  Reason for Visit/Diagnosis: Lipoma (left buttock)       FUTURE VISIT INFORMATION:  Appointment Date: 9/27/2024  Appointment Time: 8:40 AM     NOTES RECORD STATUS  DETAILS   OFFICE NOTE from Referring Provider Internal [facility]:  8/30/24 - UC OV with TITUS Vázquez   OFFICE NOTE from Other Specialists Internal [facility]:  7/23/24 - [facility] OV with Dr. Moncada   HOSPITAL DISCHARGE SUMMARY/ ED VISITS  N/A    OPERATIVE REPORT N/A    PERTINENT LABS Internal

## 2024-09-25 ENCOUNTER — TELEPHONE (OUTPATIENT)
Dept: GASTROENTEROLOGY | Facility: CLINIC | Age: 49
End: 2024-09-25
Payer: COMMERCIAL

## 2024-09-25 DIAGNOSIS — Z12.11 ENCOUNTER FOR SCREENING COLONOSCOPY: Primary | ICD-10-CM

## 2024-09-25 DIAGNOSIS — K59.00 CONSTIPATION: ICD-10-CM

## 2024-09-25 RX ORDER — BISACODYL 5 MG/1
TABLET, DELAYED RELEASE ORAL
Qty: 4 TABLET | Refills: 0 | Status: SHIPPED | OUTPATIENT
Start: 2024-09-25

## 2024-09-25 NOTE — TELEPHONE ENCOUNTER
Pre visit planning completed.      Procedure details:    Patient scheduled for Colonoscopy on 10.9.2024.     Arrival time: 1230. Procedure time 1330    Facility location: [facility]; [address], [place] [address]. Check in location: 2nd Floor.    Sedation type: MAC    Pre op exam needed? No.    Indication for procedure: screening colonoscopy      Chart review:     Electronic implanted devices? No    Recent diagnosis of diverticulitis within the last 6 weeks? No      Medication review:    Diabetic? No    Anticoagulants? No    Weight loss medication/injectable? No GLP-1 medication per patient's medication list.  RN will verify with pre-assessment call.    Other medication HOLDING recommendations:  N/A      Prep for procedure:     Bowel prep recommendation: Extended Golytely. Bowel prep prescription sent to    [facility] [address] [address] [address]   Due to: chronic pain medication noted in chart.  and constipation noted or reported.     Prep instructions sent via [email or web address] Mares RN  Endoscopy Procedure Pre Assessment RN  [phone] option 2

## 2024-09-26 ENCOUNTER — PRE VISIT (OUTPATIENT)
Dept: SURGERY | Facility: CLINIC | Age: 49
End: 2024-09-26
Payer: COMMERCIAL

## 2024-09-26 NOTE — TELEPHONE ENCOUNTER
Pre assessment completed for upcoming procedure.   (Please see previous telephone encounter notes for complete details)      Procedure details:    Arrival time and facility location reviewed.    Pre op exam needed? No.    Designated  policy reviewed. Instructed to have someone stay 24  hours post procedure.       Medication review:    Medications reviewed. Please see supporting documentation below. Holding recommendations discussed (if applicable).       Prep for procedure:     Procedure prep instructions reviewed.        Any additional information needed:  N/A      Patient  verbalized understanding and had no questions or concerns at this time.      Rita Mares RN  Endoscopy Procedure Pre Assessment   [phone] option 2

## 2024-09-26 NOTE — TELEPHONE ENCOUNTER
Patient Telephone Reminder Call    Date of call:  09/26/24  Phone numbers:  Cell number on file:    Telephone Information:   Mobile [phone]       Reached patient/confirmed appointment:  Yes  Appointment with:   Dr. Karmen Pierre  Reason for visit:  Lipoma of buttock  Remind patient that this visit is a consultation only, NO procedure:  Yes  Can this visit be changed to a video visit:  No

## 2024-09-27 ENCOUNTER — OFFICE VISIT (OUTPATIENT)
Dept: SURGERY | Facility: CLINIC | Age: 49
End: 2024-09-27
Attending: PHYSICIAN ASSISTANT
Payer: COMMERCIAL

## 2024-09-27 ENCOUNTER — PRE VISIT (OUTPATIENT)
Dept: SURGERY | Facility: CLINIC | Age: 49
End: 2024-09-27

## 2024-09-27 VITALS
DIASTOLIC BLOOD PRESSURE: 73 MMHG | HEART RATE: 58 BPM | SYSTOLIC BLOOD PRESSURE: 124 MMHG | WEIGHT: 180 LBS | BODY MASS INDEX: 28.93 KG/M2 | OXYGEN SATURATION: 100 % | HEIGHT: 66 IN

## 2024-09-27 DIAGNOSIS — L02.92 BOIL: ICD-10-CM

## 2024-09-27 ASSESSMENT — PAIN SCALES - GENERAL: PAINLEVEL: NO PAIN (0)

## 2024-09-27 NOTE — PATIENT INSTRUCTIONS
You met with Dr. Karmen Pierre.      Today's visit instructions:    Return to the Surgery Clinic on an as needed basis and please contact our office if the lump returns and you would like it removed.     If you have questions please contact Paula RN or Madisyn RN during regular clinic hours, Monday through Friday 7:30 AM - 4:00 PM, or you can contact us via [employer] at anytime.       If you have urgent needs after-hours, weekends, or holidays please call the hospital at [phone] and ask to speak with our on-call General Surgery Team.    Appointment [phone]  Nurse Advice (Paula or Madisyn): [phone]   Surgery Scheduler (Jasmin): [phone]  Fax: [phone]

## 2024-09-27 NOTE — LETTER
9/27/2024       RE: Minerva Thibodeaux  [address]  [place] [address]     Dear Colleague,    Thank you for referring your patient, Minerva Thibodeaux, to the [facility] [place] [facility]. Please see a copy of my visit note below.    Surgery Clinic Outpatient Progress Note  September 27, 2024  Minerva Thibodeaux  [id number]    S: Minerva Thibodeaux is a 49 year old female who presents to the clinic in follow-up of I&D of a buttock abscess. The patient is eating normally and she denies any bowel complaints such as nausea, constipation or diarrhea. The patient states her pain is under control.  ROS: As above, and she denies any new complaints.  O:  180 lbs 0 oz   Pulse:  [58] 58  BP: (124)/(73) 124/73  SpO2:  [100 %] 100 %  Drain: The patient does not have any drains or tubes we are monitoring.  Physical Exam:  Gen: Alert, oriented, no distress  Psych: Normal affect  Neuro: No focal deficit  Resp: Quiet breathing  CV: Warm and well perfused.  Trunk: Healing abscess on the left buttock, no underlying mass  Ext: No obvious deformities    A/P:   Minerva Thibodeaux is a 49 year old female s/p I&D of a buttock abscess with good healing.  There is no underlying mass at this time.  If the mass recurs she will call the office and we can excise.    Total time 25 mins, the majority of which was spent in counseling.  Karmen Pierre MD [facility]  Professor of Surgery  Pager [phone]         Again, thank you for allowing me to participate in the care of your patient.      Sincerely,    Karmen Pierre MD

## 2024-09-27 NOTE — NURSING NOTE
"Chief Complaint   Patient presents with    New Patient     Lipoma of buttock       Vitals:    09/27/24 0850   BP: 124/73   BP Location: Left arm   Patient Position: Sitting   Cuff Size: Adult Regular   Pulse: 58   SpO2: 100%   Weight: 81.6 kg (180 lb)   Height: 1.676 m (5' 6\")       Body mass index is 29.05 kg/m .                          Emerson Green, EMT    "

## 2024-09-28 NOTE — PROGRESS NOTES
Surgery Clinic Outpatient Progress Note  September 27, 2024  Minerva Thibodeaux  [id number]    S: Minerva Thibodeaux is a 49 year old female who presents to the clinic in follow-up of I&D of a buttock abscess. The patient is eating normally and she denies any bowel complaints such as nausea, constipation or diarrhea. The patient states her pain is under control.  ROS: As above, and she denies any new complaints.  O:  180 lbs 0 oz   Pulse:  [58] 58  BP: (124)/(73) 124/73  SpO2:  [100 %] 100 %  Drain: The patient does not have any drains or tubes we are monitoring.  Physical Exam:  Gen: Alert, oriented, no distress  Psych: Normal affect  Neuro: No focal deficit  Resp: Quiet breathing  CV: Warm and well perfused.  Trunk: Healing abscess on the left buttock, no underlying mass  Ext: No obvious deformities    A/P:   Minerva Thibodeaux is a 49 year old female s/p I&D of a buttock abscess with good healing.  There is no underlying mass at this time.  If the mass recurs she will call the office and we can excise.    Total time 25 mins, the majority of which was spent in counseling.  Karmen Pierre MD [facility]  Professor of Surgery  Pager [phone]

## 2024-10-09 ENCOUNTER — TELEPHONE (OUTPATIENT)
Dept: GASTROENTEROLOGY | Facility: CLINIC | Age: 49
End: 2024-10-09
Payer: COMMERCIAL

## 2024-10-09 DIAGNOSIS — Z12.11 ENCOUNTER FOR SCREENING COLONOSCOPY: Primary | ICD-10-CM

## 2024-10-09 NOTE — TELEPHONE ENCOUNTER
Caller: Patient    Reason for Reschedule/Cancellation   (please be detailed, any staff messages or encounters to note?): Did not get prep in time and ride was not available.       Prior to reschedule please review:  Ordering Provider: IWONA DAVIS  Sedation Determined: MAC  Does patient have any ASC Exclusions, please identify?: No      Notes on Cancelled Procedure:  Procedure: Lower Endoscopy [Colonoscopy]   Date: 10/9/2024  Location: [facility]; [address], [place] [address]  Surgeon: Taisha      Rescheduled: Yes,   Procedure: Lower Endoscopy [Colonoscopy]    Date: 11/27/2024   Location: [facility]; [address], [place] [address]   Surgeon: Renita   Sedation Level Scheduled  MAC ,  Reason for Sedation Level pain meds   Instructions updated and sent: Estella     Does patient need PAC or Pre -Op Rescheduled? : No       Did you cancel or rescheduled an EUS procedure? No.

## 2024-10-18 ENCOUNTER — VIRTUAL VISIT (OUTPATIENT)
Dept: FAMILY MEDICINE | Facility: CLINIC | Age: 49
End: 2024-10-18
Payer: COMMERCIAL

## 2024-10-18 DIAGNOSIS — N95.1 SYMPTOMATIC MENOPAUSAL OR FEMALE CLIMACTERIC STATES: ICD-10-CM

## 2024-10-18 DIAGNOSIS — Z12.31 VISIT FOR SCREENING MAMMOGRAM: ICD-10-CM

## 2024-10-18 DIAGNOSIS — F32.2 CURRENT SEVERE EPISODE OF MAJOR DEPRESSIVE DISORDER WITHOUT PSYCHOTIC FEATURES WITHOUT PRIOR EPISODE (H): Primary | ICD-10-CM

## 2024-10-18 DIAGNOSIS — F41.8 ANXIETY ASSOCIATED WITH DEPRESSION: ICD-10-CM

## 2024-10-18 PROCEDURE — 99213 OFFICE O/P EST LOW 20 MIN: CPT | Mod: 95 | Performed by: NURSE PRACTITIONER

## 2024-10-18 RX ORDER — ESCITALOPRAM OXALATE 10 MG/1
10 TABLET ORAL DAILY
Qty: 30 TABLET | Refills: 1 | Status: SHIPPED | OUTPATIENT
Start: 2024-10-18 | End: 2024-11-08

## 2024-10-18 ASSESSMENT — ANXIETY QUESTIONNAIRES
4. TROUBLE RELAXING: MORE THAN HALF THE DAYS
6. BECOMING EASILY ANNOYED OR IRRITABLE: MORE THAN HALF THE DAYS
8. IF YOU CHECKED OFF ANY PROBLEMS, HOW DIFFICULT HAVE THESE MADE IT FOR YOU TO DO YOUR WORK, TAKE CARE OF THINGS AT HOME, OR GET ALONG WITH OTHER PEOPLE?: EXTREMELY DIFFICULT
7. FEELING AFRAID AS IF SOMETHING AWFUL MIGHT HAPPEN: SEVERAL DAYS
3. WORRYING TOO MUCH ABOUT DIFFERENT THINGS: MORE THAN HALF THE DAYS
1. FEELING NERVOUS, ANXIOUS, OR ON EDGE: NEARLY EVERY DAY
GAD7 TOTAL SCORE: 13
2. NOT BEING ABLE TO STOP OR CONTROL WORRYING: MORE THAN HALF THE DAYS
GAD7 TOTAL SCORE: 13
7. FEELING AFRAID AS IF SOMETHING AWFUL MIGHT HAPPEN: SEVERAL DAYS
GAD7 TOTAL SCORE: 13
IF YOU CHECKED OFF ANY PROBLEMS ON THIS QUESTIONNAIRE, HOW DIFFICULT HAVE THESE PROBLEMS MADE IT FOR YOU TO DO YOUR WORK, TAKE CARE OF THINGS AT HOME, OR GET ALONG WITH OTHER PEOPLE: EXTREMELY DIFFICULT
5. BEING SO RESTLESS THAT IT IS HARD TO SIT STILL: SEVERAL DAYS

## 2024-10-18 NOTE — LETTER
October 18, 2024      Minerva Thibodeaux  [address]  [place] [address]        To Whom It May Concern:    Minerva Thibodeaux was seen in our clinic. She may return to work without restrictions.  However will need to be able to take her medications at work as prescribed. Will also need to be able to be off work for her appointments.       Sincerely,        MELY Carlton CNP

## 2024-10-18 NOTE — PATIENT INSTRUCTIONS
PLAN:   1.   Symptomatic therapy suggested: will start on Lexapro once a day.  2.  Orders Placed This Encounter   Medications    escitalopram (LEXAPRO) 10 MG tablet     Sig: Take 1 tablet (10 mg) by mouth daily.     Dispense:  30 tablet     Refill:  1     3.  Follow up in 1 month for follow up on medication.   Continue with counseling regularly    Patient needs to follow up in if no improvement,or sooner if worsening of symptoms or other symptoms develop.
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
## 2024-10-31 ENCOUNTER — TELEPHONE (OUTPATIENT)
Dept: FAMILY MEDICINE | Facility: CLINIC | Age: 49
End: 2024-10-31
Payer: COMMERCIAL

## 2024-10-31 NOTE — TELEPHONE ENCOUNTER
Forms/Letter Request    Type of form/letter: FMLA - Intermittent  Number of days per episode:  unknown  Number of episodes per month:  unknown  Is Release of Information needed?: No    Do we have the form/letter: Yes: will place form in providers bin    Who is the form from? Insurance comp- [employer]     Where did/will the form come from? Patient brought in form    When is form/letter needed by: ASAP    How would you like the form/letter returned: Fax : [phone] - contact patient also for copy

## 2024-11-01 ENCOUNTER — ANCILLARY PROCEDURE (OUTPATIENT)
Dept: MAMMOGRAPHY | Facility: CLINIC | Age: 49
End: 2024-11-01
Attending: NURSE PRACTITIONER
Payer: COMMERCIAL

## 2024-11-01 DIAGNOSIS — Z12.31 VISIT FOR SCREENING MAMMOGRAM: ICD-10-CM

## 2024-11-01 PROCEDURE — 77063 BREAST TOMOSYNTHESIS BI: CPT | Mod: GC | Performed by: RADIOLOGY

## 2024-11-01 PROCEDURE — 77067 SCR MAMMO BI INCL CAD: CPT | Mod: GC | Performed by: RADIOLOGY

## 2024-11-04 ENCOUNTER — ANCILLARY ORDERS (OUTPATIENT)
Dept: MAMMOGRAPHY | Facility: CLINIC | Age: 49
End: 2024-11-04

## 2024-11-04 DIAGNOSIS — R92.8 ABNORMAL MAMMOGRAM: Primary | ICD-10-CM

## 2024-11-06 ENCOUNTER — TELEPHONE (OUTPATIENT)
Dept: FAMILY MEDICINE | Facility: CLINIC | Age: 49
End: 2024-11-06
Payer: COMMERCIAL

## 2024-11-06 NOTE — TELEPHONE ENCOUNTER
Pt calling stating she needs a hospital follow up appointment this week for repeat labs and release back to work note. Pt states symptoms are improving.  Appointment made for Friday.      Gloria FRASERN, RN

## 2024-11-07 NOTE — TELEPHONE ENCOUNTER
Completed form has been faxed to [phone] . Right-Fax reviewed to confirm form was sent without error. [facility].

## 2024-11-08 ENCOUNTER — OFFICE VISIT (OUTPATIENT)
Dept: FAMILY MEDICINE | Facility: CLINIC | Age: 49
End: 2024-11-08
Payer: COMMERCIAL

## 2024-11-08 VITALS
BODY MASS INDEX: 29.39 KG/M2 | DIASTOLIC BLOOD PRESSURE: 80 MMHG | HEART RATE: 61 BPM | SYSTOLIC BLOOD PRESSURE: 127 MMHG | WEIGHT: 182.1 LBS | TEMPERATURE: 97.9 F | OXYGEN SATURATION: 100 %

## 2024-11-08 DIAGNOSIS — R10.13 EPIGASTRIC PAIN: Primary | ICD-10-CM

## 2024-11-08 DIAGNOSIS — F41.8 ANXIETY ASSOCIATED WITH DEPRESSION: ICD-10-CM

## 2024-11-08 DIAGNOSIS — F32.2 CURRENT SEVERE EPISODE OF MAJOR DEPRESSIVE DISORDER WITHOUT PSYCHOTIC FEATURES WITHOUT PRIOR EPISODE (H): ICD-10-CM

## 2024-11-08 DIAGNOSIS — F11.20 UNCOMPLICATED OPIOID DEPENDENCE (H): ICD-10-CM

## 2024-11-08 LAB — TROPONIN T SERPL HS-MCNC: 10 NG/L

## 2024-11-08 PROCEDURE — 84484 ASSAY OF TROPONIN QUANT: CPT | Performed by: FAMILY MEDICINE

## 2024-11-08 PROCEDURE — 36415 COLL VENOUS BLD VENIPUNCTURE: CPT | Performed by: FAMILY MEDICINE

## 2024-11-08 PROCEDURE — 99214 OFFICE O/P EST MOD 30 MIN: CPT | Performed by: FAMILY MEDICINE

## 2024-11-08 RX ORDER — HYDROXYZINE HYDROCHLORIDE 25 MG/1
25-50 TABLET, FILM COATED ORAL 3 TIMES DAILY PRN
Qty: 30 TABLET | Refills: 0 | Status: SHIPPED | OUTPATIENT
Start: 2024-11-08

## 2024-11-08 RX ORDER — ESCITALOPRAM OXALATE 20 MG/1
20 TABLET ORAL DAILY
Qty: 90 TABLET | Refills: 1 | Status: SHIPPED | OUTPATIENT
Start: 2024-11-08

## 2024-11-08 ASSESSMENT — PATIENT HEALTH QUESTIONNAIRE - PHQ9
SUM OF ALL RESPONSES TO PHQ QUESTIONS 1-9: 22
10. IF YOU CHECKED OFF ANY PROBLEMS, HOW DIFFICULT HAVE THESE PROBLEMS MADE IT FOR YOU TO DO YOUR WORK, TAKE CARE OF THINGS AT HOME, OR GET ALONG WITH OTHER PEOPLE: EXTREMELY DIFFICULT
SUM OF ALL RESPONSES TO PHQ QUESTIONS 1-9: 22

## 2024-11-08 NOTE — PROGRESS NOTES
Answers submitted by the patient for this visit:  Patient Health Questionnaire (Submitted on 11/8/2024)  If you checked off any problems, how difficult have these problems made it for you to do your work, take care of things at home, or get along with other people?: Extremely difficult  PHQ9 TOTAL SCORE: 22    Assessment & Plan     Epigastric pain  Was at the ER recently for the sudden onset of some epigastric pain.  Full records reviewed including lab work and imaging.  No specific etiology found but felt to perhaps be pleurisy or similar.  Still problematic but getting a little bit better.  Will write a note off this week and have her return on Monday.  She had some borderline troponin levels, 15 and 14 -the ER recommended repeat.  I do not know if there is much value but I am happy to take a look.    Current severe episode of major depressive disorder without psychotic features without prior episode (H)  Saw Jaci Andersen in October talk about grief and depression and anxiety and she started her on Lexapro.  Referred her to the mental health team but does not have an appointment for another month or so.  Still struggling significantly with depression and anxiety and a lot related to death of her children.  We will increase Lexapro to 20 mg daily and add some hydroxyzine on an as-needed basis.  I actually have an appointment with her in about 10 days and we can follow-up with them.  - escitalopram (LEXAPRO) 20 MG tablet; Take 1 tablet (20 mg) by mouth daily.    Anxiety associated with depression  As above  - escitalopram (LEXAPRO) 20 MG tablet; Take 1 tablet (20 mg) by mouth daily.  - hydrOXYzine HCl (ATARAX) 25 MG tablet; Take 1-2 tablets (25-50 mg) by mouth 3 times daily as needed for anxiety.    Uncomplicated opioid dependence (H)  Reviewed  database.  Stable prescriptions from an outside pain provider.        MED REC REQUIRED  Post Medication Reconciliation Status: discharge medications reconciled and changed,  per note/orders  Depression Screening Follow Up        11/8/2024     1:50 PM   PHQ   PHQ-9 Total Score 22    Q9: Thoughts of better off dead/self-harm past 2 weeks Not at all        Patient-reported         11/8/2024     1:50 PM   Last PHQ-9   1.  Little interest or pleasure in doing things 3    2.  Feeling down, depressed, or hopeless 3    3.  Trouble falling or staying asleep, or sleeping too much 3    4.  Feeling tired or having little energy 3    5.  Poor appetite or overeating 3    6.  Feeling bad about yourself 3    7.  Trouble concentrating 3    8.  Moving slowly or restless 1    Q9: Thoughts of better off dead/self-harm past 2 weeks 0    PHQ-9 Total Score 22        Patient-reported         Follow Up Actions Taken             Roya Palma is a 49 year old, presenting for the following health issues:  ER F/U        11/8/2024     1:50 PM   Additional Questions   Roomed by Tshia   Accompanied by self     HPI       ED/UC Followup:    Facility:  [facility] ED  Date of visit: 11/05/2024  Reason for visit: Abdominal pain  Current Status: Patient states is still having pain    Here today in follow-up of abdominal pain from the ER but also issues of mental health struggles      Review of Systems  Constitutional, HEENT, cardiovascular, pulmonary, gi and gu systems are negative, except as otherwise noted.      Objective    /80 (BP Location: Right arm, Patient Position: Sitting, Cuff Size: Adult Large)   Pulse 61   Temp 97.9  F (36.6  C) (Oral)   Wt 82.6 kg (182 lb 1.6 oz)   LMP 12/11/2010   SpO2 100%   BMI 29.39 kg/m    Body mass index is 29.39 kg/m .  Physical Exam   Alert and pleasant but appropriately tearful  S1 and S2 normal, no murmurs, clicks, gallops or rubs. Regular rate and rhythm. Chest is clear; no wheezes or rales. No edema or JVD.  The abdomen is soft without tenderness, guarding, mass, rebound or organomegaly. Bowel sounds are normal. No CVA tenderness or inguinal  adenopathy noted.  Past labs reviewed with the patient.   Reviewed imaging            Signed Electronically by: Viri Talamantes MD

## 2024-11-08 NOTE — LETTER
November 11, 2024      Minerva Thibodeaux  [address]  [place] [address]          Dear Minerva,     That heart test looked perfectly normal.  So I think it is nothing to worry about.     Resulted Orders   Troponin T, High Sensitivity   Result Value Ref Range    Troponin T, High Sensitivity 10 <=14 ng/L      Comment:      Either a High Sensitivity Troponin T baseline (0 hours) value = 100 ng/L, or an increase in High Sensitivity Troponin T = 7 ng/L at 2 hours compared to 0 hours (2-0 hours), suggests myocardial injury, and urgent clinical attention is required.    If the 2-0 hours increase is <7 ng/L, a High Sensitivity Troponin T result above gender-specific reference ranges warrants further evaluation.   Recommendations for further evaluation include correlation with clinical decision-making tool (e.g., HEART), a 3rd High Sensitivity Troponin T test 2 hours after the 2nd (a 20% change from baseline would represent concern), admission for observation, close PCC/cardiology follow-up, or urgent outpatient provocative testing.       If you have any questions or concerns, please call the clinic at the number listed above.       Sincerely,      Viri Talamantes MD

## 2024-11-08 NOTE — LETTER
November 8, 2024      Minerva Thibodeaux  [address]  [place] [address]        Off work 11/5 - 11/10/2024.  Okay to return to work Monday 11/11/2024.          Sincerely,        Viri Talamantes MD

## 2024-11-11 RX ORDER — BISACODYL 5 MG/1
TABLET, DELAYED RELEASE ORAL
Qty: 4 TABLET | Refills: 0 | Status: SHIPPED | OUTPATIENT
Start: 2024-11-11

## 2024-11-11 NOTE — TELEPHONE ENCOUNTER
Extended Golytely Bowel Prep  recommended due to constipation noted or reported.  Instructions were sent via [employer] Bowel prep was sent 11/11/2024 to  [facility] [id number] [place] [facility] [id number] [place] [address] DR. Suzan Goetz, LPN  Endoscopy Procedure Pre Assessment   [phone] option 2

## 2024-11-11 NOTE — RESULT ENCOUNTER NOTE
Minerva,  That heart test looked perfectly normal.  So I think it is nothing to worry about.  STEVO Talamantes M.D.

## 2024-11-18 ENCOUNTER — VIRTUAL VISIT (OUTPATIENT)
Dept: FAMILY MEDICINE | Facility: CLINIC | Age: 49
End: 2024-11-18
Payer: COMMERCIAL

## 2024-11-18 DIAGNOSIS — F51.04 PSYCHOPHYSIOLOGICAL INSOMNIA: ICD-10-CM

## 2024-11-18 DIAGNOSIS — F41.8 ANXIETY ASSOCIATED WITH DEPRESSION: ICD-10-CM

## 2024-11-18 DIAGNOSIS — F32.2 CURRENT SEVERE EPISODE OF MAJOR DEPRESSIVE DISORDER WITHOUT PSYCHOTIC FEATURES WITHOUT PRIOR EPISODE (H): Primary | ICD-10-CM

## 2024-11-18 PROCEDURE — 99214 OFFICE O/P EST MOD 30 MIN: CPT | Mod: 95 | Performed by: FAMILY MEDICINE

## 2024-11-18 RX ORDER — QUETIAPINE FUMARATE 50 MG/1
50 TABLET, FILM COATED ORAL AT BEDTIME
Qty: 30 TABLET | Refills: 0 | Status: SHIPPED | OUTPATIENT
Start: 2024-11-18

## 2024-11-18 ASSESSMENT — ANXIETY QUESTIONNAIRES
GAD7 TOTAL SCORE: 21
3. WORRYING TOO MUCH ABOUT DIFFERENT THINGS: NEARLY EVERY DAY
1. FEELING NERVOUS, ANXIOUS, OR ON EDGE: NEARLY EVERY DAY
2. NOT BEING ABLE TO STOP OR CONTROL WORRYING: NEARLY EVERY DAY
7. FEELING AFRAID AS IF SOMETHING AWFUL MIGHT HAPPEN: NEARLY EVERY DAY
6. BECOMING EASILY ANNOYED OR IRRITABLE: NEARLY EVERY DAY
IF YOU CHECKED OFF ANY PROBLEMS ON THIS QUESTIONNAIRE, HOW DIFFICULT HAVE THESE PROBLEMS MADE IT FOR YOU TO DO YOUR WORK, TAKE CARE OF THINGS AT HOME, OR GET ALONG WITH OTHER PEOPLE: EXTREMELY DIFFICULT
7. FEELING AFRAID AS IF SOMETHING AWFUL MIGHT HAPPEN: NEARLY EVERY DAY
GAD7 TOTAL SCORE: 21
GAD7 TOTAL SCORE: 21
8. IF YOU CHECKED OFF ANY PROBLEMS, HOW DIFFICULT HAVE THESE MADE IT FOR YOU TO DO YOUR WORK, TAKE CARE OF THINGS AT HOME, OR GET ALONG WITH OTHER PEOPLE?: EXTREMELY DIFFICULT
5. BEING SO RESTLESS THAT IT IS HARD TO SIT STILL: NEARLY EVERY DAY
4. TROUBLE RELAXING: NEARLY EVERY DAY

## 2024-11-18 ASSESSMENT — PATIENT HEALTH QUESTIONNAIRE - PHQ9
SUM OF ALL RESPONSES TO PHQ QUESTIONS 1-9: 19
10. IF YOU CHECKED OFF ANY PROBLEMS, HOW DIFFICULT HAVE THESE PROBLEMS MADE IT FOR YOU TO DO YOUR WORK, TAKE CARE OF THINGS AT HOME, OR GET ALONG WITH OTHER PEOPLE: EXTREMELY DIFFICULT
SUM OF ALL RESPONSES TO PHQ QUESTIONS 1-9: 19

## 2024-11-18 NOTE — PROGRESS NOTES
"  If patient has telephone visit, have they been educated on video visit as preferred visit method and offered to change to video visit? NOT APPLICABLE        Instructions Relayed to Patient by Virtual Roomer:     Patient is active on [employer]:   Relayed following to patient: \"It looks like you are active on [employer], are you able to join the visit this way? If not, do you need us to send you a link now or would you like your provider to send a link via text or email when they are ready to initiate the visit?\"      Patient Confirmed they will join visit via: Email   Reminded patient to ensure they were logged on to virtual visit by arrival time listed.   Asked if patient has flexibility to initiate visit sooner than arrival time: patient stated yes, documented in appointment notes availability to initiate visit earlier than arrival time     If pediatric virtual visit, ensured pediatric patient along with parent/guardian will be present for video visit.     Patient offered the website [email or web address] and/or phone number to [employer] Help line: [phone]      Minerva is a 49 year old who is being evaluated via a billable video visit.    How would you like to obtain your AVS? [email or web address]  If the video visit is dropped, the invitation should be resent by: Send to e-mail at: [email or web address]  Will anyone else be joining your video visit? No      Assessment & Plan     Current severe episode of major depressive disorder without psychotic features without prior episode (H)  I saw the patient a little over a week ago and we increased her Lexapro from 10 mg to 20 mg.  Has not really noticed much yet but no side effects.  Still struggling with a lot of depression and anxiety symptoms, and certainly a lot of grief symptoms.  Has met once with [facility] at [facility] but does not have another appointment for a few weeks.  Unfortunately I do not know any 1 to help speed up this process but she can talk to them " about an earlier appointment.  In the meantime we could try adding some Seroquel at bedtime and see if that helps with sleep and to potentiate her medication.  But this certainly may be something that time is what is necessary and there is no magic fix.  Contact me in a couple of weeks.  - QUEtiapine (SEROQUEL) 50 MG tablet; Take 1 tablet (50 mg) by mouth at bedtime.    Anxiety associated with depression  As above  - QUEtiapine (SEROQUEL) 50 MG tablet; Take 1 tablet (50 mg) by mouth at bedtime.    Psychophysiological insomnia  As above  - QUEtiapine (SEROQUEL) 50 MG tablet; Take 1 tablet (50 mg) by mouth at bedtime.            See Patient Instructions    Roya Palma is a 49 year old, presenting for the following health issues:  Depression and Anxiety        11/8/2024     1:50 PM   Additional Questions   Roomed by Noemí   Accompanied by self       Video Start Time:  1656    History of Present Illness       Mental Health Follow-up:  Patient presents to follow-up on Depression & Anxiety.Patient's depression since last visit has been:  No change  The patient is not having other symptoms associated with depression.  Patient's anxiety since last visit has been:  No change  The patient is not having other symptoms associated with anxiety.  Any significant life events: No  Patient is feeling anxious or having panic attacks.  Patient has no concerns about alcohol or drug use.    She eats 0-1 servings of fruits and vegetables daily.She consumes 0 sweetened beverage(s) daily.She exercises with enough effort to increase her heart rate 9 or less minutes per day.  She exercises with enough effort to increase her heart rate 3 or less days per week. She is missing 1 dose(s) of medications per week.  She is not taking prescribed medications regularly due to remembering to take.         Video visit patient had a follow-up on depression as above.      Review of Systems  Constitutional, HEENT, cardiovascular, pulmonary, gi and  gu systems are negative, except as otherwise noted.      Objective           Vitals:  No vitals were obtained today due to virtual visit.    Physical Exam   GENERAL: alert and no distress  EYES: Eyes grossly normal to inspection.  No discharge or erythema, or obvious scleral/conjunctival abnormalities.  RESP: No audible wheeze, cough, or visible cyanosis.    SKIN: Visible skin clear. No significant rash, abnormal pigmentation or lesions.  NEURO: Cranial nerves grossly intact.  Mentation and speech appropriate for age.  PSYCH: Appropriate affect, tone, and pace of words    Past labs reviewed with the patient.       Video-Visit Details    Type of service:  Video Visit   Video End Time: 1703  Originating Location (pt. Location): Home    Distant Location (provider location):  Off-site  Platform used for Video Visit: Lula  Signed Electronically by: Viri Talamantes MD

## 2024-11-27 ENCOUNTER — TRANSFERRED RECORDS (OUTPATIENT)
Dept: HEALTH INFORMATION MANAGEMENT | Facility: CLINIC | Age: 49
End: 2024-11-27
Payer: COMMERCIAL

## 2024-11-27 ENCOUNTER — LAB REQUISITION (OUTPATIENT)
Dept: LAB | Facility: CLINIC | Age: 49
End: 2024-11-27
Payer: COMMERCIAL

## 2024-11-27 PROCEDURE — 88305 TISSUE EXAM BY PATHOLOGIST: CPT | Mod: TC,ORL | Performed by: COLON & RECTAL SURGERY

## 2024-11-27 PROCEDURE — 88305 TISSUE EXAM BY PATHOLOGIST: CPT | Mod: 26 | Performed by: PATHOLOGY

## 2024-11-29 LAB
PATH REPORT.COMMENTS IMP SPEC: NORMAL
PATH REPORT.COMMENTS IMP SPEC: NORMAL
PATH REPORT.FINAL DX SPEC: NORMAL
PATH REPORT.GROSS SPEC: NORMAL
PATH REPORT.MICROSCOPIC SPEC OTHER STN: NORMAL
PATH REPORT.RELEVANT HX SPEC: NORMAL
PHOTO IMAGE: NORMAL

## 2024-12-10 ENCOUNTER — TRANSFERRED RECORDS (OUTPATIENT)
Dept: HEALTH INFORMATION MANAGEMENT | Facility: CLINIC | Age: 49
End: 2024-12-10
Payer: COMMERCIAL

## 2025-03-06 ENCOUNTER — OFFICE VISIT (OUTPATIENT)
Dept: OPTOMETRY | Facility: CLINIC | Age: 50
End: 2025-03-06
Payer: COMMERCIAL

## 2025-03-06 DIAGNOSIS — H52.13 MYOPIA, BILATERAL: ICD-10-CM

## 2025-03-06 DIAGNOSIS — H52.4 PRESBYOPIA: Primary | ICD-10-CM

## 2025-03-06 ASSESSMENT — REFRACTION_MANIFEST
OD_AXIS: 038
OS_AXIS: 040
OD_SPHERE: -0.50
OS_CYLINDER: +0.25
OS_SPHERE: -0.25
OS_CYLINDER: SPHERE
OD_CYLINDER: +0.25
OS_ADD: +2.50
OS_AXIS: 077
OD_ADD: +2.50
OS_SPHERE: -0.25
OD_CYLINDER: +0.25
OD_SPHERE: -0.25
OD_AXIS: 040
METHOD_AUTOREFRACTION: 1

## 2025-03-06 ASSESSMENT — VISUAL ACUITY
OD_SC: 20/40
OS_SC: 20/40 -1
OD_SC: 20/20
OS_SC: 20/20
METHOD: SNELLEN - LINEAR

## 2025-03-06 ASSESSMENT — CONF VISUAL FIELD
OS_NORMAL: 1
OD_NORMAL: 1
OS_INFERIOR_TEMPORAL_RESTRICTION: 0
OD_INFERIOR_NASAL_RESTRICTION: 0
OD_SUPERIOR_NASAL_RESTRICTION: 0
OS_INFERIOR_NASAL_RESTRICTION: 0
METHOD: COUNTING FINGERS
OD_INFERIOR_TEMPORAL_RESTRICTION: 0
OS_SUPERIOR_NASAL_RESTRICTION: 0
OD_SUPERIOR_TEMPORAL_RESTRICTION: 0
OS_SUPERIOR_TEMPORAL_RESTRICTION: 0

## 2025-03-06 ASSESSMENT — KERATOMETRY
OS_AXISANGLE_DEGREES: 092
OD_AXISANGLE2_DEGREES: 159
OS_AXISANGLE2_DEGREES: 002
OD_K1POWER_DIOPTERS: 43.50
OS_K1POWER_DIOPTERS: 43.25
OD_K2POWER_DIOPTERS: 44.25
OD_AXISANGLE_DEGREES: 069
OS_K2POWER_DIOPTERS: 44.25

## 2025-03-06 ASSESSMENT — TONOMETRY
IOP_METHOD: APPLANATION
OS_IOP_MMHG: 14
OD_IOP_MMHG: 14

## 2025-03-06 ASSESSMENT — EXTERNAL EXAM - LEFT EYE: OS_EXAM: NORMAL

## 2025-03-06 ASSESSMENT — SLIT LAMP EXAM - LIDS: COMMENTS: NORMAL

## 2025-03-06 ASSESSMENT — CUP TO DISC RATIO
OD_RATIO: 0.25
OS_RATIO: 0.25

## 2025-03-06 ASSESSMENT — EXTERNAL EXAM - RIGHT EYE: OD_EXAM: NORMAL

## 2025-03-06 NOTE — PROGRESS NOTES
Chief Complaint   Patient presents with    Annual Eye Exam         Last Eye Exam: 12/2023  Dilated Previously: Yes    What are you currently using to see?  Glasses - lost glasses in June, not with today       Distance Vision Acuity: Satisfied with vision    Near Vision Acuity: Not satisfied     Eye Comfort: good  Do you use eye drops? : No  Occupation or Hobbies:     Karmen Trejo    Optometric Assistant            Medical, surgical and family histories reviewed and updated 3/6/2025.       OBJECTIVE: See Ophthalmology exam    ASSESSMENT:    ICD-10-CM    1. Presbyopia  H52.4       2. Myopia, bilateral  H52.13           PLAN:     Patient Instructions   Myopia is a result of long eyes. It is commonly referred to as near-sightedness. Seeing clearly in the distance is the main challenge.    Presbyopia is the diagnosis. Presbyopia is an age-related condition where the eye's crystalline lens doesn't change shape as easily as it once did.    Eyeglass prescription given.    The affects of the dilating drops last for 4- 6 hours.  You will be more sensitive to light and vision will be blurry up close.  Do not drive if you do not feel comfortable.  Mydriatic sunglasses were given if needed.    Recommend annual eye exams.    Shelby Alejandro O.D.  [address] [id number] [phone]       26-Jul-2017

## 2025-03-06 NOTE — PATIENT INSTRUCTIONS
Myopia is a result of long eyes. It is commonly referred to as near-sightedness. Seeing clearly in the distance is the main challenge.    Presbyopia is the diagnosis. Presbyopia is an age-related condition where the eye's crystalline lens doesn't change shape as easily as it once did.    Eyeglass prescription given.    The affects of the dilating drops last for 4- 6 hours.  You will be more sensitive to light and vision will be blurry up close.  Do not drive if you do not feel comfortable.  Mydriatic sunglasses were given if needed.    Recommend annual eye exams.    Shelby Alejandro O.D.  [facility]   [address] [place], [place] [address] [phone]

## 2025-03-06 NOTE — LETTER
3/6/2025      Minerva Thibodeaux  [address]  [facility] [address]      Dear Colleague,    Thank you for referring your patient, Minerva Thibodeaux, to the [facility]. Please see a copy of my visit note below.    Chief Complaint   Patient presents with     Annual Eye Exam         Last Eye Exam: 12/2023  Dilated Previously: Yes    What are you currently using to see?  Glasses - lost glasses in June, not with today       Distance Vision Acuity: Satisfied with vision    Near Vision Acuity: Not satisfied     Eye Comfort: good  Do you use eye drops? : No  Occupation or Hobbies:     Karmen Trejo    Optometric Assistant            Medical, surgical and family histories reviewed and updated 3/6/2025.       OBJECTIVE: See Ophthalmology exam    ASSESSMENT:    ICD-10-CM    1. Presbyopia  H52.4       2. Myopia, bilateral  H52.13           PLAN:     Patient Instructions   Myopia is a result of long eyes. It is commonly referred to as near-sightedness. Seeing clearly in the distance is the main challenge.    Presbyopia is the diagnosis. Presbyopia is an age-related condition where the eye's crystalline lens doesn't change shape as easily as it once did.    Eyeglass prescription given.    The affects of the dilating drops last for 4- 6 hours.  You will be more sensitive to light and vision will be blurry up close.  Do not drive if you do not feel comfortable.  Mydriatic sunglasses were given if needed.    Recommend annual eye exams.    Shelby Alejandro O.D.  [facility]   [address] [place] [address]    [phone]          Again, thank you for allowing me to participate in the care of your patient.        Sincerely,        Shelby Alejandro, GUS    Electronically signed

## 2025-04-02 DIAGNOSIS — J30.2 SEASONAL ALLERGIC RHINITIS, UNSPECIFIED TRIGGER: ICD-10-CM

## 2025-04-02 RX ORDER — FLUTICASONE PROPIONATE 50 MCG
1 SPRAY, SUSPENSION (ML) NASAL DAILY
Qty: 16 G | Refills: 4 | Status: SHIPPED | OUTPATIENT
Start: 2025-04-02

## 2025-04-30 ENCOUNTER — TELEPHONE (OUTPATIENT)
Dept: FAMILY MEDICINE | Facility: CLINIC | Age: 50
End: 2025-04-30
Payer: COMMERCIAL

## 2025-04-30 DIAGNOSIS — J30.2 SEASONAL ALLERGIC RHINITIS, UNSPECIFIED TRIGGER: ICD-10-CM

## 2025-04-30 RX ORDER — FLUTICASONE PROPIONATE 50 MCG
1 SPRAY, SUSPENSION (ML) NASAL DAILY
Qty: 32 G | Refills: 5 | Status: SHIPPED | OUTPATIENT
Start: 2025-04-30

## 2025-04-30 NOTE — TELEPHONE ENCOUNTER
I will resend the prescription asking for 2 canisters per prescription and that way she can keep plenty on hand.

## 2025-04-30 NOTE — TELEPHONE ENCOUNTER
"Patient is calling to request FLONASE spray to be used BID into each nostril.  \"I need it BID during blooming season for my allergies.  I need one container every month.  Pharmacy is dispensing every other month.\"    Pharmacy is not able to dispense early despite refills available.    Selena Mendez RN, BSN  [facility]      "

## 2025-07-13 ENCOUNTER — HEALTH MAINTENANCE LETTER (OUTPATIENT)
Age: 50
End: 2025-07-13